# Patient Record
Sex: MALE | Race: WHITE | NOT HISPANIC OR LATINO | URBAN - METROPOLITAN AREA
[De-identification: names, ages, dates, MRNs, and addresses within clinical notes are randomized per-mention and may not be internally consistent; named-entity substitution may affect disease eponyms.]

---

## 2018-12-20 ENCOUNTER — EMERGENCY (EMERGENCY)
Facility: HOSPITAL | Age: 68
LOS: 1 days | Discharge: HOME | End: 2018-12-20
Attending: INTERNAL MEDICINE | Admitting: INTERNAL MEDICINE
Payer: MEDICARE

## 2018-12-20 VITALS
DIASTOLIC BLOOD PRESSURE: 80 MMHG | OXYGEN SATURATION: 98 % | TEMPERATURE: 98 F | SYSTOLIC BLOOD PRESSURE: 122 MMHG | HEART RATE: 80 BPM | RESPIRATION RATE: 17 BRPM

## 2018-12-20 VITALS
WEIGHT: 190.04 LBS | DIASTOLIC BLOOD PRESSURE: 81 MMHG | RESPIRATION RATE: 17 BRPM | OXYGEN SATURATION: 96 % | SYSTOLIC BLOOD PRESSURE: 139 MMHG | HEIGHT: 72 IN | TEMPERATURE: 97 F | HEART RATE: 86 BPM

## 2018-12-20 DIAGNOSIS — S01.111A LACERATION WITHOUT FOREIGN BODY OF RIGHT EYELID AND PERIOCULAR AREA, INITIAL ENCOUNTER: ICD-10-CM

## 2018-12-20 LAB
ALBUMIN SERPL ELPH-MCNC: 3.4 G/DL — SIGNIFICANT CHANGE UP (ref 3.3–5)
ALP SERPL-CCNC: 65 U/L — SIGNIFICANT CHANGE UP (ref 40–120)
ALT FLD-CCNC: 32 U/L DA — SIGNIFICANT CHANGE UP (ref 10–45)
ANION GAP SERPL CALC-SCNC: 7 MMOL/L — SIGNIFICANT CHANGE UP (ref 5–17)
AST SERPL-CCNC: 28 U/L — SIGNIFICANT CHANGE UP (ref 10–40)
BASOPHILS # BLD AUTO: 0.1 K/UL — SIGNIFICANT CHANGE UP (ref 0–0.2)
BASOPHILS NFR BLD AUTO: 0.6 % — SIGNIFICANT CHANGE UP (ref 0–2)
BILIRUB SERPL-MCNC: 0.5 MG/DL — SIGNIFICANT CHANGE UP (ref 0.2–1.2)
BUN SERPL-MCNC: 19 MG/DL — SIGNIFICANT CHANGE UP (ref 7–23)
CALCIUM SERPL-MCNC: 8.6 MG/DL — SIGNIFICANT CHANGE UP (ref 8.4–10.5)
CHLORIDE SERPL-SCNC: 104 MMOL/L — SIGNIFICANT CHANGE UP (ref 96–108)
CO2 SERPL-SCNC: 29 MMOL/L — SIGNIFICANT CHANGE UP (ref 22–31)
CREAT SERPL-MCNC: 1.16 MG/DL — SIGNIFICANT CHANGE UP (ref 0.5–1.3)
EOSINOPHIL # BLD AUTO: 0.1 K/UL — SIGNIFICANT CHANGE UP (ref 0–0.5)
EOSINOPHIL NFR BLD AUTO: 1.2 % — SIGNIFICANT CHANGE UP (ref 0–6)
GLUCOSE SERPL-MCNC: 144 MG/DL — HIGH (ref 70–99)
HCT VFR BLD CALC: 43.3 % — SIGNIFICANT CHANGE UP (ref 39–50)
HGB BLD-MCNC: 14.4 G/DL — SIGNIFICANT CHANGE UP (ref 13–17)
LYMPHOCYTES # BLD AUTO: 1.4 K/UL — SIGNIFICANT CHANGE UP (ref 1–3.3)
LYMPHOCYTES # BLD AUTO: 11.5 % — LOW (ref 13–44)
MCHC RBC-ENTMCNC: 29.8 PG — SIGNIFICANT CHANGE UP (ref 27–34)
MCHC RBC-ENTMCNC: 33.3 GM/DL — SIGNIFICANT CHANGE UP (ref 32–36)
MCV RBC AUTO: 89.5 FL — SIGNIFICANT CHANGE UP (ref 80–100)
MONOCYTES # BLD AUTO: 0.5 K/UL — SIGNIFICANT CHANGE UP (ref 0–0.9)
MONOCYTES NFR BLD AUTO: 4.5 % — SIGNIFICANT CHANGE UP (ref 1–9)
NEUTROPHILS # BLD AUTO: 9.9 K/UL — HIGH (ref 1.8–7.4)
NEUTROPHILS NFR BLD AUTO: 82.2 % — HIGH (ref 43–77)
PLATELET # BLD AUTO: 202 K/UL — SIGNIFICANT CHANGE UP (ref 150–400)
POTASSIUM SERPL-MCNC: 4.8 MMOL/L — SIGNIFICANT CHANGE UP (ref 3.5–5.3)
POTASSIUM SERPL-SCNC: 4.8 MMOL/L — SIGNIFICANT CHANGE UP (ref 3.5–5.3)
PROT SERPL-MCNC: 6.6 G/DL — SIGNIFICANT CHANGE UP (ref 6–8.3)
RBC # BLD: 4.84 M/UL — SIGNIFICANT CHANGE UP (ref 4.2–5.8)
RBC # FLD: 11.6 % — SIGNIFICANT CHANGE UP (ref 10.3–14.5)
SODIUM SERPL-SCNC: 140 MMOL/L — SIGNIFICANT CHANGE UP (ref 135–145)
WBC # BLD: 12 K/UL — HIGH (ref 3.8–10.5)
WBC # FLD AUTO: 12 K/UL — HIGH (ref 3.8–10.5)

## 2018-12-20 PROCEDURE — 80053 COMPREHEN METABOLIC PANEL: CPT

## 2018-12-20 PROCEDURE — 72125 CT NECK SPINE W/O DYE: CPT | Mod: 26

## 2018-12-20 PROCEDURE — 71260 CT THORAX DX C+: CPT | Mod: 26

## 2018-12-20 PROCEDURE — 72125 CT NECK SPINE W/O DYE: CPT

## 2018-12-20 PROCEDURE — 71260 CT THORAX DX C+: CPT

## 2018-12-20 PROCEDURE — 90471 IMMUNIZATION ADMIN: CPT

## 2018-12-20 PROCEDURE — 85027 COMPLETE CBC AUTOMATED: CPT

## 2018-12-20 PROCEDURE — 90715 TDAP VACCINE 7 YRS/> IM: CPT

## 2018-12-20 PROCEDURE — 99284 EMERGENCY DEPT VISIT MOD MDM: CPT

## 2018-12-20 PROCEDURE — 74177 CT ABD & PELVIS W/CONTRAST: CPT | Mod: 26

## 2018-12-20 PROCEDURE — 99284 EMERGENCY DEPT VISIT MOD MDM: CPT | Mod: 25

## 2018-12-20 PROCEDURE — 74177 CT ABD & PELVIS W/CONTRAST: CPT

## 2018-12-20 RX ORDER — OXYCODONE AND ACETAMINOPHEN 5; 325 MG/1; MG/1
1 TABLET ORAL
Qty: 15 | Refills: 0
Start: 2018-12-20 | End: 2018-12-22

## 2018-12-20 RX ORDER — MORPHINE SULFATE 50 MG/1
4 CAPSULE, EXTENDED RELEASE ORAL ONCE
Qty: 0 | Refills: 0 | Status: DISCONTINUED | OUTPATIENT
Start: 2018-12-20 | End: 2018-12-20

## 2018-12-20 RX ORDER — IBUPROFEN 200 MG
1 TABLET ORAL
Qty: 30 | Refills: 0
Start: 2018-12-20 | End: 2018-12-29

## 2018-12-20 RX ORDER — TETANUS TOXOID, REDUCED DIPHTHERIA TOXOID AND ACELLULAR PERTUSSIS VACCINE, ADSORBED 5; 2.5; 8; 8; 2.5 [IU]/.5ML; [IU]/.5ML; UG/.5ML; UG/.5ML; UG/.5ML
0.5 SUSPENSION INTRAMUSCULAR ONCE
Qty: 0 | Refills: 0 | Status: COMPLETED | OUTPATIENT
Start: 2018-12-20 | End: 2018-12-20

## 2018-12-20 RX ORDER — DIAZEPAM 5 MG
5 TABLET ORAL ONCE
Qty: 0 | Refills: 0 | Status: DISCONTINUED | OUTPATIENT
Start: 2018-12-20 | End: 2018-12-20

## 2018-12-20 RX ORDER — DIAZEPAM 5 MG
1 TABLET ORAL
Qty: 9 | Refills: 0
Start: 2018-12-20 | End: 2018-12-22

## 2018-12-20 RX ADMIN — MORPHINE SULFATE 4 MILLIGRAM(S): 50 CAPSULE, EXTENDED RELEASE ORAL at 19:30

## 2018-12-20 RX ADMIN — MORPHINE SULFATE 4 MILLIGRAM(S): 50 CAPSULE, EXTENDED RELEASE ORAL at 19:15

## 2018-12-20 RX ADMIN — MORPHINE SULFATE 4 MILLIGRAM(S): 50 CAPSULE, EXTENDED RELEASE ORAL at 14:46

## 2018-12-20 RX ADMIN — TETANUS TOXOID, REDUCED DIPHTHERIA TOXOID AND ACELLULAR PERTUSSIS VACCINE, ADSORBED 0.5 MILLILITER(S): 5; 2.5; 8; 8; 2.5 SUSPENSION INTRAMUSCULAR at 16:10

## 2018-12-20 RX ADMIN — MORPHINE SULFATE 4 MILLIGRAM(S): 50 CAPSULE, EXTENDED RELEASE ORAL at 14:31

## 2018-12-20 RX ADMIN — Medication 5 MILLIGRAM(S): at 14:31

## 2018-12-20 NOTE — ED PROVIDER NOTE - OBJECTIVE STATEMENT
68 year old male, PMHx of HTN; presents to the ED s/p fall. Patient states he was in the attic trying to get his cat, and he accidentally fell through the ceiling vent. He fell from ceiling height to ground approximately 8.5ft lading on his left side. He was able to slowly get up and ambulate to the car to come to the ED. He complains of severe left lower back pain. He sustained a right eyebrow superficial laceration (possibly on the metal vent); but had no other head trauma. He denies LOC, neck pain, numbness, tingling, changes in bowel/bladder continence, abdominal pain, or other complaints.

## 2018-12-20 NOTE — ED PROVIDER NOTE - NS CPE EDP MUSC LUMBAR LOC
Left lower paraspinal lumbar tenderness. No VPT, no step offs, no deformities, no discoloration/tenderness

## 2018-12-20 NOTE — ED ADULT NURSE NOTE - OBJECTIVE STATEMENT
as per patient he was getting wendy gifts out of the attic and he fell straight down about 8.5ft. pt complaining of 8/10 back pain. pt denies LOC. pt denies hitting head. pt denies sob, chest pain. pt denies numbness/tingling.

## 2018-12-20 NOTE — ED ADULT NURSE NOTE - NSIMPLEMENTINTERV_GEN_ALL_ED
Implemented All Fall with Harm Risk Interventions:  [place] to call system. Call bell, personal items and telephone within reach. Instruct patient to call for assistance. Room bathroom lighting operational. Non-slip footwear when patient is off stretcher. Physically safe environment: no spills, clutter or unnecessary equipment. Stretcher in lowest position, wheels locked, appropriate side rails in place. Provide visual cue, wrist band, yellow gown, etc. Monitor gait and stability. Monitor for mental status changes and reorient to person, place, and time. Review medications for side effects contributing to fall risk. Reinforce activity limits and safety measures with patient and family. Provide visual clues: red socks.

## 2018-12-20 NOTE — ED PROVIDER NOTE - CHPI ED SYMPTOMS NEG
no bleeding/no fever/no deformity/no confusion/no loss of consciousness/no numbness/no tingling/no weakness

## 2018-12-20 NOTE — ED PROVIDER NOTE - MEDICAL DECISION MAKING DETAILS
68 year old male pw back pain s/p fall x 8.5ft- will ct given CHERYL, labs, pain control, last tdap unknown, will update

## 2018-12-20 NOTE — ED PROVIDER NOTE - NSFOLLOWUPINSTRUCTIONS_ED_ALL_ED_FT
Rest, advance activity as tolerated  Apply heat to affected area 15 min on/15 min off  Take Motrin 800mg every 8 hours with food as needed for pain  Take Valium 5mg every 8 hours as needed - DO NOT DRIVE ON THIS MEDICATION  Take Percocet 1 tab every 6 hours as needed for breakthrough pain- DO NOT DRIVE ON THIS MEDICATION  Follow up with your PMD within 2-3 days  Return to the ER for worsening    Follow up with orthopedics  If you have issues obtaining follow up, please call: [phone] to obtain a doctor or specialist who takes your insurance in your area.

## 2019-01-04 PROBLEM — I10 ESSENTIAL (PRIMARY) HYPERTENSION: Chronic | Status: ACTIVE | Noted: 2018-12-20

## 2019-01-11 ENCOUNTER — APPOINTMENT (OUTPATIENT)
Dept: INTERNAL MEDICINE | Facility: CLINIC | Age: 69
End: 2019-01-11
Payer: MEDICARE

## 2019-01-11 VITALS
WEIGHT: 189 LBS | OXYGEN SATURATION: 100 % | BODY MASS INDEX: 25.6 KG/M2 | SYSTOLIC BLOOD PRESSURE: 140 MMHG | TEMPERATURE: 97.9 F | RESPIRATION RATE: 16 BRPM | HEART RATE: 90 BPM | HEIGHT: 72 IN | DIASTOLIC BLOOD PRESSURE: 80 MMHG

## 2019-01-11 DIAGNOSIS — Z72.3 LACK OF PHYSICAL EXERCISE: ICD-10-CM

## 2019-01-11 PROCEDURE — G0439: CPT

## 2019-01-11 RX ORDER — OXYCODONE AND ACETAMINOPHEN 5; 325 MG/1; MG/1
5-325 TABLET ORAL
Qty: 15 | Refills: 0 | Status: DISCONTINUED | COMMUNITY
Start: 2018-12-20

## 2019-01-11 RX ORDER — DIAZEPAM 5 MG/1
5 TABLET ORAL
Qty: 9 | Refills: 0 | Status: DISCONTINUED | COMMUNITY
Start: 2018-12-20

## 2019-01-11 RX ORDER — IBUPROFEN 800 MG/1
800 TABLET, FILM COATED ORAL
Qty: 30 | Refills: 0 | Status: DISCONTINUED | COMMUNITY
Start: 2018-12-20

## 2019-01-11 RX ORDER — TRAMADOL HYDROCHLORIDE 50 MG/1
50 TABLET, COATED ORAL
Qty: 60 | Refills: 0 | Status: DISCONTINUED | COMMUNITY
Start: 2019-01-03

## 2019-01-11 RX ORDER — CYCLOBENZAPRINE HYDROCHLORIDE 10 MG/1
10 TABLET, FILM COATED ORAL
Qty: 40 | Refills: 0 | Status: DISCONTINUED | COMMUNITY
Start: 2019-01-03

## 2019-01-11 NOTE — HEALTH RISK ASSESSMENT
[No falls in past year] : Patient reported no falls in the past year [0] : 2) Feeling down, depressed, or hopeless: Not at all (0) [HIV test declined] : HIV test declined [Hepatitis C test declined] : Hepatitis C test declined [[id number]] : 0

## 2019-01-11 NOTE — HISTORY OF PRESENT ILLNESS
[FreeTextEntry1] : Comes to the office as a new patient for a comprehensive physical examination to review his medications discuss his overall health and received a slip for complete blood tests [de-identified] : 68-year-old man comes to the office as a new patient for a comprehensive physical examination past history significant for hypertension recently undergoing a fall on 20 December falling through his attic roof was evaluated in the emergency room had CAT scans of his neck chest abdomen and pelvis without serious injury he is still having pain in his lower back she describes as 2-3/10 it has improved he denies temperature chills sweats or myalgias he's had no headache sinus congestion sore throat or wheezing shortness of breath chest pain pleurisy personal dyspnea lightheadedness palpitations dizziness vertigo or syncope he denies abdominal pain dysuria nausea vomiting diarrhea constipation bright red blood per rectum or black stools appetite has been good his weight has been stable doesn't exercise because of an issue with the bottom of her foot which she has been trying to deal with with his podiatrist

## 2019-01-11 NOTE — REVIEW OF SYSTEMS
[Negative] : Heme/Lymph [Nocturia] : nocturia [Impotence] : impotence [Fever] : no fever [Chills] : no chills [Fatigue] : no fatigue [Hot Flashes] : no hot flashes [Night Sweats] : no night sweats [Recent Change In Weight] : ~T no recent weight change [Discharge] : no discharge [Pain] : no pain [Redness] : no redness [Dryness] : no dryness [Vision Problems] : no vision problems [Itching] : no itching [Earache] : no earache [Hearing Loss] : no hearing loss [Nosebleeds] : no nosebleeds [Postnasal Drip] : no postnasal drip [Nasal Discharge] : no nasal discharge [Sore Throat] : no sore throat [Hoarseness] : no hoarseness [Chest Pain] : no chest pain [Palpitations] : no palpitations [Claudication] : no  leg claudication [Lower Ext Edema] : no lower extremity edema [Orthopena] : no orthopnea [Paroysmal Nocturnal Dyspnea] : no paroysmal nocturnal dyspnea [Shortness Of Breath] : no shortness of breath [Wheezing] : no wheezing [Cough] : no cough [Dyspnea on Exertion] : not dyspnea on exertion [Abdominal Pain] : no abdominal pain [Nausea] : no nausea [Constipation] : no constipation [Diarrhea] : no diarrhea [Vomiting] : no vomiting [Heartburn] : no heartburn [Melena] : no melena [Dysuria] : no dysuria [Incontinence] : no incontinence [Hesitancy] : no hesitancy [Hematuria] : no hematuria [Frequency] : no frequency [Poor Libido] : libido not poor [Joint Pain] : no joint pain [Joint Stiffness] : no joint stiffness [Muscle Pain] : no muscle pain [Muscle Weakness] : no muscle weakness [Back Pain] : no back pain [Joint Swelling] : no joint swelling [Itching] : no itching [Mole Changes] : no mole changes [Nail Changes] : no nail changes [Hair Changes] : no hair changes [Skin Rash] : no skin rash [Headache] : no headache [Dizziness] : no dizziness [Fainting] : no fainting [Confusion] : no confusion [Unsteady Walk] : no ataxia [Memory Loss] : no memory loss [Suicidal] : not suicidal [Insomnia] : no insomnia [Anxiety] : no anxiety [Depression] : no depression [Easy Bleeding] : no easy bleeding [Easy Bruising] : no easy bruising [Swollen Glands] : no swollen glands

## 2019-01-11 NOTE — ASSESSMENT
[FreeTextEntry1] : Zickel exam shows a well-developed man in no acute distress his blood pressure was 140/86 feet 189 pounds heart rate of 90 respirations of 16 HEENT was unremarkable chest was clear to auscultation and percussion cardiovascular exam showed a normal S1 and S2 with no extra heart sounds or murmurs abdomen was soft extremities showed no clubbing cyanosis or edema the neurologic exam was nonfocal patient defers an EKG today will think about it during his next visit a slip was given for complete blood tests including CBC full chemistries hemoglobin A1c fasting blood sugar thyroid profile lipid profile urinalysis and vitamin D 3 and B12 patient is up to date with his ophthalmologist and dermatologist and is scheduled to make an appointment with Dr. Cook for a screening colonoscopy.Patient is asking about discontinuing his blood pressure pill present blood pressures pills cannot be reduced he was placed on a DASH diet and is encouraged to correct his problem was his foot so he could incorporate exercise into his schedule

## 2019-01-30 LAB
25(OH)D3 SERPL-MCNC: 34.5 NG/ML
ALBUMIN SERPL ELPH-MCNC: 4.4 G/DL
ALP BLD-CCNC: 145 U/L
ALT SERPL-CCNC: 32 U/L
ANION GAP SERPL CALC-SCNC: 11 MMOL/L
APPEARANCE: CLEAR
AST SERPL-CCNC: 23 U/L
BASOPHILS # BLD AUTO: 0.03 K/UL
BASOPHILS NFR BLD AUTO: 0.4 %
BILIRUB SERPL-MCNC: 0.6 MG/DL
BILIRUBIN URINE: NEGATIVE
BLOOD URINE: NEGATIVE
BUN SERPL-MCNC: 17 MG/DL
CALCIUM SERPL-MCNC: 9.6 MG/DL
CHLORIDE SERPL-SCNC: 101 MMOL/L
CHOLEST SERPL-MCNC: 184 MG/DL
CHOLEST/HDLC SERPL: 3.4 RATIO
CO2 SERPL-SCNC: 28 MMOL/L
COLOR: YELLOW
CREAT SERPL-MCNC: 1.06 MG/DL
CRP SERPL HS-MCNC: 0.4 MG/L
EOSINOPHIL # BLD AUTO: 0.54 K/UL
EOSINOPHIL NFR BLD AUTO: 8 %
GLUCOSE BS SERPL-MCNC: 96 MG/DL
GLUCOSE QUALITATIVE U: NEGATIVE MG/DL
GLUCOSE SERPL-MCNC: 105 MG/DL
HBA1C MFR BLD HPLC: 5.8 %
HCT VFR BLD CALC: 45.2 %
HDLC SERPL-MCNC: 54 MG/DL
HGB BLD-MCNC: 14.6 G/DL
IMM GRANULOCYTES NFR BLD AUTO: 0.3 %
KETONES URINE: NEGATIVE
LDLC SERPL CALC-MCNC: 113 MG/DL
LEUKOCYTE ESTERASE URINE: NEGATIVE
LYMPHOCYTES # BLD AUTO: 1.99 K/UL
LYMPHOCYTES NFR BLD AUTO: 29.4 %
MAN DIFF?: NORMAL
MCHC RBC-ENTMCNC: 29.9 PG
MCHC RBC-ENTMCNC: 32.3 GM/DL
MCV RBC AUTO: 92.4 FL
MONOCYTES # BLD AUTO: 0.68 K/UL
MONOCYTES NFR BLD AUTO: 10 %
NEUTROPHILS # BLD AUTO: 3.52 K/UL
NEUTROPHILS NFR BLD AUTO: 51.9 %
NITRITE URINE: NEGATIVE
PH URINE: 6
PLATELET # BLD AUTO: 240 K/UL
POTASSIUM SERPL-SCNC: 5 MMOL/L
PROT SERPL-MCNC: 7 G/DL
PROTEIN URINE: NEGATIVE MG/DL
PSA FREE FLD-MCNC: 23 %
PSA FREE SERPL-MCNC: 0.19 NG/ML
PSA SERPL-MCNC: 0.84 NG/ML
RBC # BLD: 4.89 M/UL
RBC # FLD: 13.2 %
SODIUM SERPL-SCNC: 140 MMOL/L
SPECIFIC GRAVITY URINE: 1.01
T4 FREE SERPL-MCNC: 1.6 NG/DL
TESTOST BND SERPL-MCNC: 8.6 PG/ML
TESTOST SERPL-MCNC: 371.1 NG/DL
TRIGL SERPL-MCNC: 84 MG/DL
TSH SERPL-ACNC: 3.38 UIU/ML
UROBILINOGEN URINE: NEGATIVE MG/DL
VIT B12 SERPL-MCNC: 676 PG/ML
WBC # FLD AUTO: 6.78 K/UL

## 2019-10-22 ENCOUNTER — APPOINTMENT (OUTPATIENT)
Dept: INTERNAL MEDICINE | Facility: CLINIC | Age: 69
End: 2019-10-22
Payer: MEDICARE

## 2019-10-22 VITALS
HEIGHT: 72 IN | RESPIRATION RATE: 15 BRPM | TEMPERATURE: 97.7 F | HEART RATE: 64 BPM | OXYGEN SATURATION: 97 % | DIASTOLIC BLOOD PRESSURE: 80 MMHG | WEIGHT: 187 LBS | SYSTOLIC BLOOD PRESSURE: 146 MMHG | BODY MASS INDEX: 25.33 KG/M2

## 2019-10-22 PROCEDURE — 99215 OFFICE O/P EST HI 40 MIN: CPT

## 2019-10-22 NOTE — HISTORY OF PRESENT ILLNESS
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 for followup patient is complaining of episodic fatigue snoring stiffness of his neck and occasional pain in his lower back he denies temperature chills sweats or myalgias he's had no headaches sinus congestion sore throat cough wheezing pleurisy chest pain shortness of breath exertional dyspnea lightheadedness palpitations dizziness vertigo or syncope he denies abdominal pain nausea vomiting diarrhea or constipation bright red blood per rectum or black stools his appetite has been good his weight has been stable he denies significant musculoskeletal complaints he does have bad snoring he has had no leg edema or recent skin rashes

## 2019-10-22 NOTE — REVIEW OF SYSTEMS
[Nocturia] : nocturia [Impotence] : impotence [Negative] : Psychiatric [Fever] : no fever [Fatigue] : no fatigue [Chills] : no chills [Hot Flashes] : no hot flashes [Night Sweats] : no night sweats [Discharge] : no discharge [Recent Change In Weight] : ~T no recent weight change [Redness] : no redness [Pain] : no pain [Dryness] : no dryness [Vision Problems] : no vision problems [Itching] : no itching [Earache] : no earache [Hearing Loss] : no hearing loss [Nosebleeds] : no nosebleeds [Nasal Discharge] : no nasal discharge [Postnasal Drip] : no postnasal drip [Sore Throat] : no sore throat [Palpitations] : no palpitations [Hoarseness] : no hoarseness [Chest Pain] : no chest pain [Claudication] : no  leg claudication [Lower Ext Edema] : no lower extremity edema [Orthopena] : no orthopnea [Shortness Of Breath] : no shortness of breath [Cough] : no cough [Dyspnea on Exertion] : not dyspnea on exertion [Wheezing] : no wheezing [Abdominal Pain] : no abdominal pain [Nausea] : no nausea [Diarrhea] : no diarrhea [Vomiting] : no vomiting [Constipation] : no constipation [Heartburn] : no heartburn [Dysuria] : no dysuria [Melena] : no melena [Incontinence] : no incontinence [Hematuria] : no hematuria [Hesitancy] : no hesitancy [Frequency] : no frequency [Poor Libido] : libido not poor [Joint Pain] : no joint pain [Muscle Pain] : no muscle pain [Joint Stiffness] : no joint stiffness [Muscle Weakness] : no muscle weakness [Back Pain] : no back pain [Joint Swelling] : no joint swelling [Mole Changes] : no mole changes [Hair Changes] : no hair changes [Nail Changes] : no nail changes [Skin Rash] : no skin rash [Headache] : no headache [Dizziness] : no dizziness [Confusion] : no confusion [Fainting] : no fainting [Memory Loss] : no memory loss [Unsteady Walk] : no ataxia [Suicidal] : not suicidal [Insomnia] : no insomnia [Depression] : no depression [Anxiety] : no anxiety [Easy Bleeding] : no easy bleeding [Easy Bruising] : no easy bruising [Swollen Glands] : no swollen glands

## 2019-10-22 NOTE — HEALTH RISK ASSESSMENT
[Yes] : Yes [No falls in past year] : Patient reported no falls in the past year [0] : 2) Feeling down, depressed, or hopeless: Not at all (0) [[id number]] : 0 [] : No

## 2019-10-22 NOTE — PHYSICAL EXAM
[No Acute Distress] : no acute distress [Well Nourished] : well nourished [Well Developed] : well developed [Well-Appearing] : well-appearing [Normal Sclera/Conjunctiva] : normal sclera/conjunctiva [Normal Voice/Communication] : normal voice/communication [EOMI] : extraocular movements intact [PERRL] : pupils equal round and reactive to light [Normal Outer Ear/Nose] : the outer ears and nose were normal in appearance [No JVD] : no jugular venous distention [Normal Oropharynx] : the oropharynx was normal [No Lymphadenopathy] : no lymphadenopathy [Supple] : supple [Thyroid Normal, No Nodules] : the thyroid was normal and there were no nodules present [No Accessory Muscle Use] : no accessory muscle use [No Respiratory Distress] : no respiratory distress  [Clear to Auscultation] : lungs were clear to auscultation bilaterally [Normal Rate] : normal rate  [Regular Rhythm] : with a regular rhythm [Normal S1, S2] : normal S1 and S2 [No Murmur] : no murmur heard [No Carotid Bruits] : no carotid bruits [No Abdominal Bruit] : a ~M bruit was not heard ~T in the abdomen [No Varicosities] : no varicosities [No Edema] : there was no peripheral edema [Pedal Pulses Present] : the pedal pulses are present [No Palpable Aorta] : no palpable aorta [Soft] : abdomen soft [No Extremity Clubbing/Cyanosis] : no extremity clubbing/cyanosis [Non-distended] : non-distended [Non Tender] : non-tender [No HSM] : no HSM [Normal Bowel Sounds] : normal bowel sounds [No Masses] : no abdominal mass palpated [Normal Posterior Cervical Nodes] : no posterior cervical lymphadenopathy [Normal Anterior Cervical Nodes] : no anterior cervical lymphadenopathy [No CVA Tenderness] : no CVA  tenderness [No Spinal Tenderness] : no spinal tenderness [No Joint Swelling] : no joint swelling [Grossly Normal Strength/Tone] : grossly normal strength/tone [Coordination Grossly Intact] : coordination grossly intact [No Rash] : no rash [No Focal Deficits] : no focal deficits [Normal Gait] : normal gait [Deep Tendon Reflexes (DTR)] : deep tendon reflexes were 2+ and symmetric [Normal Affect] : the affect was normal [Normal Insight/Judgement] : insight and judgment were intact

## 2019-10-22 NOTE — ASSESSMENT
[FreeTextEntry1] : Physical exam shows a well-developed man in no acute distress blood pressure 146/80 pulse is 64 respirations 15 HEENT was unremarkable chest was clear vascular was regular with no extra heart sounds or murmurs abdomen was soft and nontender extremities showed no clubbing cyanosis or edema neurologic exam was nonfocal patient requesting physical therapy on a stiff neck and lower back issue as well as to help him with his posture as per his episodes of fatigue I am uncertain as to its etiology I suggested sleep study neuro evaluation for possible narcolepsy and cardiac evaluation or rule out heart rhythm problems he is up to date with his ophthalmologist and dermatologist however he is behind on his colonoscopy followup he has received notifications from Dr. Cook .Blood pressure well controlled

## 2019-12-11 ENCOUNTER — RX RENEWAL (OUTPATIENT)
Age: 69
End: 2019-12-11

## 2019-12-31 ENCOUNTER — TRANSCRIPTION ENCOUNTER (OUTPATIENT)
Age: 69
End: 2019-12-31

## 2020-06-15 ENCOUNTER — RX RENEWAL (OUTPATIENT)
Age: 70
End: 2020-06-15

## 2020-09-23 ENCOUNTER — RX RENEWAL (OUTPATIENT)
Age: 70
End: 2020-09-23

## 2021-01-10 ENCOUNTER — RX RENEWAL (OUTPATIENT)
Age: 71
End: 2021-01-10

## 2021-01-15 ENCOUNTER — RX RENEWAL (OUTPATIENT)
Age: 71
End: 2021-01-15

## 2021-01-22 ENCOUNTER — NON-APPOINTMENT (OUTPATIENT)
Age: 71
End: 2021-01-22

## 2021-01-22 ENCOUNTER — APPOINTMENT (OUTPATIENT)
Dept: INTERNAL MEDICINE | Facility: CLINIC | Age: 71
End: 2021-01-22
Payer: MEDICARE

## 2021-01-22 VITALS
RESPIRATION RATE: 16 BRPM | HEART RATE: 64 BPM | OXYGEN SATURATION: 96 % | SYSTOLIC BLOOD PRESSURE: 130 MMHG | HEIGHT: 72 IN | BODY MASS INDEX: 24.92 KG/M2 | TEMPERATURE: 98.1 F | DIASTOLIC BLOOD PRESSURE: 78 MMHG | WEIGHT: 184 LBS

## 2021-01-22 PROCEDURE — G0439: CPT

## 2021-01-22 PROCEDURE — 93000 ELECTROCARDIOGRAM COMPLETE: CPT | Mod: 59

## 2021-01-22 PROCEDURE — G0444 DEPRESSION SCREEN ANNUAL: CPT | Mod: 59

## 2021-01-24 NOTE — REVIEW OF SYSTEMS
[Nocturia] : nocturia [Frequency] : frequency [Impotence] : impotence [Negative] : Heme/Lymph [Fever] : no fever [Chills] : no chills [Fatigue] : no fatigue [Hot Flashes] : no hot flashes [Night Sweats] : no night sweats [Recent Change In Weight] : ~T no recent weight change [Discharge] : no discharge [Pain] : no pain [Redness] : no redness [Dryness] : no dryness [Vision Problems] : no vision problems [Itching] : no itching [Earache] : no earache [Hearing Loss] : no hearing loss [Nosebleeds] : no nosebleeds [Postnasal Drip] : no postnasal drip [Nasal Discharge] : no nasal discharge [Sore Throat] : no sore throat [Hoarseness] : no hoarseness [Chest Pain] : no chest pain [Palpitations] : no palpitations [Claudication] : no  leg claudication [Lower Ext Edema] : no lower extremity edema [Orthopena] : no orthopnea [Paroxysmal Nocturnal Dyspnea] : no paroxysmal nocturnal dyspnea [Wheezing] : no wheezing [Shortness Of Breath] : no shortness of breath [Cough] : no cough [Dyspnea on Exertion] : not dyspnea on exertion [Abdominal Pain] : no abdominal pain [Nausea] : no nausea [Constipation] : no constipation [Diarrhea] : no diarrhea [Vomiting] : no vomiting [Heartburn] : no heartburn [Melena] : no melena [Dysuria] : no dysuria [Hesitancy] : no hesitancy [Incontinence] : no incontinence [Hematuria] : no hematuria [Poor Libido] : libido not poor [Joint Pain] : no joint pain [Joint Stiffness] : no joint stiffness [Muscle Pain] : no muscle pain [Muscle Weakness] : no muscle weakness [Back Pain] : back pain [Joint Swelling] : no joint swelling [Mole Changes] : no mole changes [Nail Changes] : no nail changes [Hair Changes] : no hair changes [Skin Rash] : no skin rash [Headache] : no headache [Dizziness] : no dizziness [Fainting] : no fainting [Confusion] : no confusion [Unsteady Walk] : no ataxia [Memory Loss] : no memory loss [Suicidal] : not suicidal [Insomnia] : no insomnia [Anxiety] : no anxiety [Easy Bleeding] : no easy bleeding [Depression] : depression [Easy Bruising] : no easy bruising [Swollen Glands] : no swollen glands

## 2021-01-24 NOTE — HISTORY OF PRESENT ILLNESS
[FreeTextEntry1] : 70-year-old man comes to the office for a comprehensive physical examination to review his medications and discuss his overall health recently urinating frequently and feeling tired and down [de-identified] : Comes to the office for a comprehensive physical examination with a history of lower back pain ocular nevus of the right eye and fatigue and depression he denies temperature chills sweats or myalgias he has had no headache sinus congestion sore throat cough wheezing pleurisy chest pain shortness of breath exertional dyspnea lightheadedness palpitations dizziness vertigo or syncope he has had no abdominal pain nausea vomiting.patient bright red blood per rectum or black stools his appetite has been good his weight has been stable he has occasional lower back pain urinates frequently and does gets up several times at night to urinate but denies dysuria or gross hematuria he has had no leg edema skin rashes and usually well

## 2021-01-24 NOTE — HEALTH RISK ASSESSMENT
[HIV test declined] : HIV test declined [Hepatitis C test declined] : Hepatitis C test declined [] : No [Yes] : Yes [No falls in past year] : Patient reported no falls in the past year [0] : 2) Feeling down, depressed, or hopeless: Not at all (0) [[id number]] : o

## 2021-01-24 NOTE — ASSESSMENT
[FreeTextEntry1] : Sickle exam shows a well-developed man in no acute distress blood pressure 130/78 height 6 feet weight 184 pounds BMI 24.95 temperature 98.1 °F heart rate of 64 respiration 16 oxygen saturation on room air 98% HEENT was unremarkable chest was clear cardiovascular exam was regular abdomen was soft and nontender extremities showed no clubbing cyanosis or edema neurologic exam was nonfocal EKG showed a normal sinus rhythm at 60 left atrial enlargement and possibly an incomplete right bundle branch block we will obtain old EKGs for comparison patient's recent blood tests were reviewed and significant for PSA of 0.6 total cholesterol of 194 HDL 59  globin A1c 5.7% the entity of prediabetes was discussed with him  vitamin D level was 42.6 patient was recommended to get the influenza vaccine the Prevnar 13 and the Pneumovax 23 vaccine as well as the new shingles vaccine patient also was reminded to keep current with his colonoscopy he does see a ophthalmologist and will make an appointment for cancer screen to a dermatologist patient follows actively because of his nevus of his right eye

## 2021-01-24 NOTE — PHYSICAL EXAM
[Well Nourished] : well nourished [Well Developed] : well developed [Well-Appearing] : well-appearing [Normal Voice/Communication] : normal voice/communication [Normal Sclera/Conjunctiva] : normal sclera/conjunctiva [PERRL] : pupils equal round and reactive to light [EOMI] : extraocular movements intact [Normal Oropharynx] : the oropharynx was normal [Normal Outer Ear/Nose] : the outer ears and nose were normal in appearance [No JVD] : no jugular venous distention [No Lymphadenopathy] : no lymphadenopathy [Supple] : supple [Thyroid Normal, No Nodules] : the thyroid was normal and there were no nodules present [No Respiratory Distress] : no respiratory distress  [No Accessory Muscle Use] : no accessory muscle use [Clear to Auscultation] : lungs were clear to auscultation bilaterally [Normal Rate] : normal rate  [Regular Rhythm] : with a regular rhythm [Normal S1, S2] : normal S1 and S2 [No Murmur] : no murmur heard [No Carotid Bruits] : no carotid bruits [No Abdominal Bruit] : a ~M bruit was not heard ~T in the abdomen [No Varicosities] : no varicosities [Pedal Pulses Present] : the pedal pulses are present [No Edema] : there was no peripheral edema [No Palpable Aorta] : no palpable aorta [No Extremity Clubbing/Cyanosis] : no extremity clubbing/cyanosis [Soft] : abdomen soft [Non-distended] : non-distended [Non Tender] : non-tender [No Masses] : no abdominal mass palpated [No HSM] : no HSM [Normal Bowel Sounds] : normal bowel sounds [Normal Posterior Cervical Nodes] : no posterior cervical lymphadenopathy [No CVA Tenderness] : no CVA  tenderness [Normal Anterior Cervical Nodes] : no anterior cervical lymphadenopathy [No Spinal Tenderness] : no spinal tenderness [No Joint Swelling] : no joint swelling [Grossly Normal Strength/Tone] : grossly normal strength/tone [No Rash] : no rash [Coordination Grossly Intact] : coordination grossly intact [Normal Gait] : normal gait [No Focal Deficits] : no focal deficits [Deep Tendon Reflexes (DTR)] : deep tendon reflexes were 2+ and symmetric [Normal Affect] : the affect was normal [Normal Insight/Judgement] : insight and judgment were intact

## 2021-01-25 ENCOUNTER — TRANSCRIPTION ENCOUNTER (OUTPATIENT)
Age: 71
End: 2021-01-25

## 2021-02-12 LAB
25(OH)D3 SERPL-MCNC: 42.6 NG/ML
ALBUMIN SERPL ELPH-MCNC: 4.2 G/DL
ALP BLD-CCNC: 71 U/L
ALT SERPL-CCNC: 20 U/L
ANION GAP SERPL CALC-SCNC: 12 MMOL/L
APPEARANCE: CLEAR
AST SERPL-CCNC: 20 U/L
BASOPHILS # BLD AUTO: 0.05 K/UL
BASOPHILS NFR BLD AUTO: 0.7 %
BILIRUB SERPL-MCNC: 0.7 MG/DL
BILIRUBIN URINE: NEGATIVE
BLOOD URINE: NEGATIVE
BUN SERPL-MCNC: 20 MG/DL
CALCIUM SERPL-MCNC: 9.3 MG/DL
CHLORIDE SERPL-SCNC: 104 MMOL/L
CHOLEST SERPL-MCNC: 194 MG/DL
CO2 SERPL-SCNC: 27 MMOL/L
COLOR: YELLOW
CREAT SERPL-MCNC: 1.16 MG/DL
CRP SERPL HS-MCNC: 0.21 MG/L
EOSINOPHIL # BLD AUTO: 0.6 K/UL
EOSINOPHIL NFR BLD AUTO: 8.5 %
ESTIMATED AVERAGE GLUCOSE: 117 MG/DL
GLUCOSE BS SERPL-MCNC: 92 MG/DL
GLUCOSE QUALITATIVE U: NEGATIVE
GLUCOSE SERPL-MCNC: 100 MG/DL
HBA1C MFR BLD HPLC: 5.7 %
HCT VFR BLD CALC: 44.7 %
HDLC SERPL-MCNC: 59 MG/DL
HGB BLD-MCNC: 14.7 G/DL
IMM GRANULOCYTES NFR BLD AUTO: 0.3 %
KETONES URINE: NEGATIVE
LDLC SERPL CALC-MCNC: 121 MG/DL
LEUKOCYTE ESTERASE URINE: NEGATIVE
LYMPHOCYTES # BLD AUTO: 2.36 K/UL
LYMPHOCYTES NFR BLD AUTO: 33.6 %
MAN DIFF?: NORMAL
MCHC RBC-ENTMCNC: 30.2 PG
MCHC RBC-ENTMCNC: 32.9 GM/DL
MCV RBC AUTO: 92 FL
MEV IGG FLD QL IA: >300 AU/ML
MEV IGG+IGM SER-IMP: POSITIVE
MONOCYTES # BLD AUTO: 0.57 K/UL
MONOCYTES NFR BLD AUTO: 8.1 %
MUV AB SER-ACNC: POSITIVE
MUV IGG SER QL IA: 277 AU/ML
NEUTROPHILS # BLD AUTO: 3.42 K/UL
NEUTROPHILS NFR BLD AUTO: 48.8 %
NITRITE URINE: NEGATIVE
NONHDLC SERPL-MCNC: 136 MG/DL
PH URINE: 6
PLATELET # BLD AUTO: 211 K/UL
POTASSIUM SERPL-SCNC: 4.8 MMOL/L
PROT SERPL-MCNC: 6.4 G/DL
PROTEIN URINE: NEGATIVE
PSA FREE FLD-MCNC: 27 %
PSA FREE SERPL-MCNC: 0.16 NG/ML
PSA SERPL-MCNC: 0.6 NG/ML
RBC # BLD: 4.86 M/UL
RBC # FLD: 12.4 %
RUBV IGG FLD-ACNC: 16.6 INDEX
RUBV IGG SER-IMP: POSITIVE
SARS-COV-2 IGG SERPL IA-ACNC: 0.08 INDEX
SARS-COV-2 IGG SERPL QL IA: NEGATIVE
SODIUM SERPL-SCNC: 143 MMOL/L
SPECIFIC GRAVITY URINE: 1.02
T4 FREE SERPL-MCNC: 1.3 NG/DL
TRIGL SERPL-MCNC: 71 MG/DL
TSH SERPL-ACNC: 5.66 UIU/ML
UROBILINOGEN URINE: NORMAL
VIT B12 SERPL-MCNC: 602 PG/ML
WBC # FLD AUTO: 7.02 K/UL

## 2021-03-09 ENCOUNTER — OUTPATIENT (OUTPATIENT)
Dept: OUTPATIENT SERVICES | Facility: HOSPITAL | Age: 71
LOS: 1 days | End: 2021-03-09
Payer: MEDICARE

## 2021-03-09 ENCOUNTER — APPOINTMENT (OUTPATIENT)
Dept: MRI IMAGING | Facility: HOSPITAL | Age: 71
End: 2021-03-09
Payer: MEDICARE

## 2021-03-09 DIAGNOSIS — Z00.8 ENCOUNTER FOR OTHER GENERAL EXAMINATION: ICD-10-CM

## 2021-03-09 PROCEDURE — 72148 MRI LUMBAR SPINE W/O DYE: CPT

## 2021-03-09 PROCEDURE — 72148 MRI LUMBAR SPINE W/O DYE: CPT | Mod: 26,MH

## 2022-03-11 ENCOUNTER — APPOINTMENT (OUTPATIENT)
Dept: INTERNAL MEDICINE | Facility: CLINIC | Age: 72
End: 2022-03-11
Payer: MEDICARE

## 2022-03-11 ENCOUNTER — NON-APPOINTMENT (OUTPATIENT)
Age: 72
End: 2022-03-11

## 2022-03-11 VITALS
DIASTOLIC BLOOD PRESSURE: 92 MMHG | TEMPERATURE: 98.1 F | BODY MASS INDEX: 25.19 KG/M2 | WEIGHT: 186 LBS | RESPIRATION RATE: 16 BRPM | HEART RATE: 63 BPM | SYSTOLIC BLOOD PRESSURE: 142 MMHG | HEIGHT: 72 IN | OXYGEN SATURATION: 97 %

## 2022-03-11 DIAGNOSIS — Z00.00 ENCOUNTER FOR GENERAL ADULT MEDICAL EXAMINATION W/OUT ABNORMAL FINDINGS: ICD-10-CM

## 2022-03-11 PROCEDURE — 93000 ELECTROCARDIOGRAM COMPLETE: CPT | Mod: 59

## 2022-03-11 PROCEDURE — G0439: CPT

## 2022-03-11 RX ORDER — SERTRALINE HYDROCHLORIDE 50 MG/1
50 TABLET, FILM COATED ORAL DAILY
Qty: 30 | Refills: 0 | Status: DISCONTINUED | COMMUNITY
Start: 2021-01-22 | End: 2022-03-11

## 2022-03-16 NOTE — ASSESSMENT
[FreeTextEntry1] : Physical examination shows a well-developed man in no acute distress blood pressure 142/92 height 6 feet weight 186 pounds BMI 25.23 temperature 98.1 °F rate of 63 respirations at 16 oxygen saturation on room air was 97% HEENT was unremarkable chest was clear cardiovascular exam was regular with no extra heart sounds or murmurs abdomen was soft and nontender extremities showed no clubbing cyanosis or edema and neurologic exam was nonfocal EKG showed a normal sinus rhythm at 60 there were no acute ST-T wave changes slip was given for complete blood tests including CBC full chemistries lipid profile thyroid profile urine analysis CRP vitamins D3 and B12 hemoglobin A1c COVID-19 nuclear capsid and spike domain antibodies patient is up-to-date with his ophthalmologist and dermatologist he will contact his gastroenterologist for the timing of his next colonoscopy patient is trying to eat better and walking exercises much as possible his blood pressure was well controlled at the present visit his ocular nevi are followed closely by a retinologist

## 2022-03-16 NOTE — PHYSICAL EXAM
[No Acute Distress] : no acute distress [Well Nourished] : well nourished [Well Developed] : well developed [Well-Appearing] : well-appearing [Normal Voice/Communication] : normal voice/communication [Normal Sclera/Conjunctiva] : normal sclera/conjunctiva [PERRL] : pupils equal round and reactive to light [EOMI] : extraocular movements intact [Normal Outer Ear/Nose] : the outer ears and nose were normal in appearance [Normal Oropharynx] : the oropharynx was normal [Normal TMs] : both tympanic membranes were normal [Normal Nasal Mucosa] : the nasal mucosa was normal [No JVD] : no jugular venous distention [No Lymphadenopathy] : no lymphadenopathy [Supple] : supple [Thyroid Normal, No Nodules] : the thyroid was normal and there were no nodules present [No Respiratory Distress] : no respiratory distress  [No Accessory Muscle Use] : no accessory muscle use [Clear to Auscultation] : lungs were clear to auscultation bilaterally [Normal Rate] : normal rate  [Regular Rhythm] : with a regular rhythm [Normal S1, S2] : normal S1 and S2 [No Murmur] : no murmur heard [No Carotid Bruits] : no carotid bruits [No Abdominal Bruit] : a ~M bruit was not heard ~T in the abdomen [No Varicosities] : no varicosities [Pedal Pulses Present] : the pedal pulses are present [No Edema] : there was no peripheral edema [No Palpable Aorta] : no palpable aorta [No Extremity Clubbing/Cyanosis] : no extremity clubbing/cyanosis [Declined Breast Exam] : declined breast exam  [Soft] : abdomen soft [Non Tender] : non-tender [Non-distended] : non-distended [No Masses] : no abdominal mass palpated [No HSM] : no HSM [Normal Bowel Sounds] : normal bowel sounds [No Hernias] : no hernias [Declined Rectal Exam] : declined rectal exam [Normal Supraclavicular Nodes] : no supraclavicular lymphadenopathy [Normal Axillary Nodes] : no axillary lymphadenopathy [Normal Posterior Cervical Nodes] : no posterior cervical lymphadenopathy [Normal Anterior Cervical Nodes] : no anterior cervical lymphadenopathy [Normal Inguinal Nodes] : no inguinal lymphadenopathy [Normal Femoral Nodes] : no femoral lymphadenopathy [No CVA Tenderness] : no CVA  tenderness [No Spinal Tenderness] : no spinal tenderness [No Joint Swelling] : no joint swelling [Grossly Normal Strength/Tone] : grossly normal strength/tone [No Rash] : no rash [No Skin Lesions] : no skin lesions [Coordination Grossly Intact] : coordination grossly intact [No Focal Deficits] : no focal deficits [Normal Gait] : normal gait [Deep Tendon Reflexes (DTR)] : deep tendon reflexes were 2+ and symmetric [Normal Affect] : the affect was normal [Normal Insight/Judgement] : insight and judgment were intact [Kyphosis] : no kyphosis [Scoliosis] : no scoliosis [Acne] : no acne

## 2022-03-16 NOTE — HISTORY OF PRESENT ILLNESS
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 benign essential hypertension and periods of depression patient denies temperature chills sweats or myalgias has had no headache sinus congestion sore throat cough wheezing pleurisy chest pain shortness of breath exertional dyspnea lightheadedness palpitations dizziness vertigo or syncope denies abdominal pain nausea vomiting diarrhea constipation bright red blood per rectum or black stools appetite has been good weight has been stable does urinate frequently and get up at night to urinate but denies dysuria or gross hematuria has occasional back pain denies leg edema skin rashes has periods of depression but reports his sleeping has been adequate recently

## 2022-03-16 NOTE — REVIEW OF SYSTEMS
[Nocturia] : nocturia [Frequency] : frequency [Impotence] : impotence [Back Pain] : back pain [Depression] : depression [Negative] : Heme/Lymph [Fever] : no fever [Chills] : no chills [Fatigue] : fatigue [Hot Flashes] : no hot flashes [Night Sweats] : no night sweats [Recent Change In Weight] : ~T no recent weight change [Discharge] : no discharge [Pain] : no pain [Redness] : no redness [Dryness] : no dryness [Vision Problems] : no vision problems [Itching] : no itching [Earache] : no earache [Hearing Loss] : no hearing loss [Nosebleeds] : no nosebleeds [Postnasal Drip] : no postnasal drip [Nasal Discharge] : no nasal discharge [Sore Throat] : no sore throat [Hoarseness] : no hoarseness [Chest Pain] : no chest pain [Palpitations] : no palpitations [Claudication] : no  leg claudication [Lower Ext Edema] : no lower extremity edema [Orthopena] : no orthopnea [Paroxysmal Nocturnal Dyspnea] : no paroxysmal nocturnal dyspnea [Shortness Of Breath] : no shortness of breath [Wheezing] : no wheezing [Cough] : no cough [Dyspnea on Exertion] : not dyspnea on exertion [Abdominal Pain] : no abdominal pain [Nausea] : no nausea [Constipation] : no constipation [Diarrhea] : no diarrhea [Vomiting] : no vomiting [Heartburn] : no heartburn [Melena] : no melena [Dysuria] : no dysuria [Incontinence] : no incontinence [Hesitancy] : no hesitancy [Hematuria] : no hematuria [Poor Libido] : libido not poor [Joint Pain] : no joint pain [Joint Stiffness] : no joint stiffness [Muscle Pain] : no muscle pain [Muscle Weakness] : no muscle weakness [Joint Swelling] : no joint swelling [Itching] : no itching [Mole Changes] : no mole changes [Nail Changes] : no nail changes [Hair Changes] : no hair changes [Skin Rash] : no skin rash [Headache] : no headache [Dizziness] : no dizziness [Fainting] : no fainting [Confusion] : no confusion [Unsteady Walk] : no ataxia [Memory Loss] : no memory loss [Suicidal] : not suicidal [Insomnia] : no insomnia [Anxiety] : no anxiety [Easy Bleeding] : no easy bleeding [Easy Bruising] : no easy bruising [Swollen Glands] : no swollen glands

## 2022-03-25 ENCOUNTER — APPOINTMENT (OUTPATIENT)
Dept: SURGERY | Facility: CLINIC | Age: 72
End: 2022-03-25
Payer: MEDICARE

## 2022-03-25 VITALS — WEIGHT: 188 LBS | HEIGHT: 72 IN | BODY MASS INDEX: 25.47 KG/M2 | TEMPERATURE: 98.2 F

## 2022-03-25 DIAGNOSIS — Z82.49 FAMILY HISTORY OF ISCHEMIC HEART DISEASE AND OTHER DISEASES OF THE CIRCULATORY SYSTEM: ICD-10-CM

## 2022-03-25 DIAGNOSIS — Z12.12 ENCOUNTER FOR SCREENING FOR MALIGNANT NEOPLASM OF COLON: ICD-10-CM

## 2022-03-25 DIAGNOSIS — Z12.11 ENCOUNTER FOR SCREENING FOR MALIGNANT NEOPLASM OF COLON: ICD-10-CM

## 2022-03-25 DIAGNOSIS — Z82.0 FAMILY HISTORY OF EPILEPSY AND OTHER DISEASES OF THE NERVOUS SYSTEM: ICD-10-CM

## 2022-03-25 DIAGNOSIS — R00.2 PALPITATIONS: ICD-10-CM

## 2022-03-25 PROCEDURE — 99202 OFFICE O/P NEW SF 15 MIN: CPT

## 2022-03-25 NOTE — REVIEW OF SYSTEMS
[Negative] : Gastrointestinal [Heart Rate Is Slow] : the heart rate was not slow [Chest Pain] : no chest pain

## 2022-03-25 NOTE — ASSESSMENT
[FreeTextEntry1] : Long discussion regarding all options and risks\par Bowel prep written and explained\par Scheduled for colonoscopy on 3/29/22\par All lab values and imaging studies reviewed\par Discussed with Medicine

## 2022-03-25 NOTE — PHYSICAL EXAM
[Normal Breath Sounds] : Normal breath sounds [Normal Heart Sounds] : normal heart sounds [Normal Rate and Rhythm] : normal rate and rhythm [No Rash or Lesion] : No rash or lesion [Abdominal Masses] : No abdominal masses [Abdomen Tenderness] : ~T ~M No abdominal tenderness [de-identified] : nl [de-identified] : nl [de-identified] : nl

## 2022-03-25 NOTE — HISTORY OF PRESENT ILLNESS
[de-identified] : This 71 year old man last underwent a colonoscopy over ten years ago. There is no FH of colon CA.

## 2022-03-28 ENCOUNTER — TRANSCRIPTION ENCOUNTER (OUTPATIENT)
Age: 72
End: 2022-03-28

## 2022-03-28 ENCOUNTER — OUTPATIENT (OUTPATIENT)
Dept: OUTPATIENT SERVICES | Facility: HOSPITAL | Age: 72
LOS: 1 days | End: 2022-03-28
Payer: MEDICARE

## 2022-03-28 DIAGNOSIS — Z20.828 CONTACT WITH AND (SUSPECTED) EXPOSURE TO OTHER VIRAL COMMUNICABLE DISEASES: ICD-10-CM

## 2022-03-28 LAB — SARS-COV-2 RNA SPEC QL NAA+PROBE: SIGNIFICANT CHANGE UP

## 2022-03-28 PROCEDURE — U0005: CPT

## 2022-03-28 PROCEDURE — U0003: CPT

## 2022-03-29 ENCOUNTER — APPOINTMENT (OUTPATIENT)
Dept: SURGERY | Facility: HOSPITAL | Age: 72
End: 2022-03-29

## 2022-03-29 ENCOUNTER — OUTPATIENT (OUTPATIENT)
Dept: OUTPATIENT SERVICES | Facility: HOSPITAL | Age: 72
LOS: 1 days | End: 2022-03-29
Payer: MEDICARE

## 2022-03-29 DIAGNOSIS — Z12.11 ENCOUNTER FOR SCREENING FOR MALIGNANT NEOPLASM OF COLON: ICD-10-CM

## 2022-03-29 PROCEDURE — G0121: CPT

## 2022-03-29 PROCEDURE — G0121 COLON CA SCRN NOT HI RSK IND: CPT

## 2022-03-29 RX ORDER — SODIUM CHLORIDE 9 MG/ML
500 INJECTION INTRAMUSCULAR; INTRAVENOUS; SUBCUTANEOUS
Refills: 0 | Status: COMPLETED | OUTPATIENT
Start: 2022-03-29 | End: 2022-03-29

## 2022-03-29 RX ADMIN — SODIUM CHLORIDE 75 MILLILITER(S): 9 INJECTION INTRAMUSCULAR; INTRAVENOUS; SUBCUTANEOUS at 09:04

## 2022-04-21 ENCOUNTER — APPOINTMENT (OUTPATIENT)
Dept: INTERNAL MEDICINE | Facility: CLINIC | Age: 72
End: 2022-04-21
Payer: MEDICARE

## 2022-04-21 VITALS
RESPIRATION RATE: 14 BRPM | HEART RATE: 102 BPM | DIASTOLIC BLOOD PRESSURE: 84 MMHG | SYSTOLIC BLOOD PRESSURE: 140 MMHG | OXYGEN SATURATION: 97 % | TEMPERATURE: 98 F

## 2022-04-21 PROCEDURE — 99215 OFFICE O/P EST HI 40 MIN: CPT

## 2022-04-21 RX ORDER — SODIUM PICOSULFATE, MAGNESIUM OXIDE, AND ANHYDROUS CITRIC ACID 10; 3.5; 12 MG/160ML; G/160ML; G/160ML
10-3.5-12 MG-GM LIQUID ORAL
Qty: 1 | Refills: 0 | Status: DISCONTINUED | COMMUNITY
Start: 2022-03-25 | End: 2022-04-21

## 2022-04-27 VITALS
RESPIRATION RATE: 16 BRPM | BODY MASS INDEX: 25.47 KG/M2 | WEIGHT: 188 LBS | HEIGHT: 72 IN | TEMPERATURE: 98 F | HEART RATE: 102 BPM | OXYGEN SATURATION: 97 %

## 2022-04-27 NOTE — ASSESSMENT
[FreeTextEntry1] : Physical examination reveals a well-developed  man in no acute distress pressure 140/80 pulse of 102 respirations 16 temperature 98 °F height 6 feet weight 188 pounds BMI 25.5 HEENT was unremarkable chest was clear cardiovascular exam was regular abdomen was soft and nontender extremities showed no clubbing cyanosis or edema neurologic exam with was nonfocal patient's blood pressure slightly elevated and his metoprolol was increased to 50 mg succinate a day  he follows regularly with his ophthalmologist and retinologist for his ocular nevus he is overdue on complete blood tests which were ordered including CBC full chemistries lipid profile thyroid profile urinalysis CRP vitamins D3 and B12 hemoglobin A1c COVID-19 nuclear capsid and spike domain antibodies and PSA patient was given a slip for chest x-ray in March 2022 patient's anxiety and depression has improved significantly with the addition of sertraline 50 mg daily

## 2022-04-27 NOTE — HEALTH RISK ASSESSMENT
[Never] : Never [Yes] : Yes [No falls in past year] : Patient reported no falls in the past year [0] : 2) Feeling down, depressed, or hopeless: Not at all (0) [PHQ-2 Negative - No further assessment needed] : PHQ-2 Negative - No further assessment needed [de-identified] : occasional [[id number]] : 0

## 2022-04-27 NOTE — REVIEW OF SYSTEMS
[Fatigue] : fatigue [Nocturia] : nocturia [Frequency] : frequency [Impotence] : impotence [Back Pain] : back pain [Depression] : depression [Negative] : Heme/Lymph [Fever] : no fever [Chills] : no chills [Hot Flashes] : no hot flashes [Night Sweats] : no night sweats [Recent Change In Weight] : ~T no recent weight change [Discharge] : no discharge [Pain] : no pain [Redness] : no redness [Dryness] : no dryness [Vision Problems] : no vision problems [Itching] : no itching [Earache] : no earache [Hearing Loss] : no hearing loss [Nosebleeds] : no nosebleeds [Postnasal Drip] : no postnasal drip [Nasal Discharge] : no nasal discharge [Sore Throat] : no sore throat [Hoarseness] : no hoarseness [Chest Pain] : no chest pain [Palpitations] : no palpitations [Claudication] : no  leg claudication [Lower Ext Edema] : no lower extremity edema [Orthopena] : no orthopnea [Paroxysmal Nocturnal Dyspnea] : no paroxysmal nocturnal dyspnea [Shortness Of Breath] : no shortness of breath [Wheezing] : no wheezing [Cough] : no cough [Dyspnea on Exertion] : not dyspnea on exertion [Abdominal Pain] : no abdominal pain [Nausea] : no nausea [Constipation] : no constipation [Diarrhea] : no diarrhea [Vomiting] : no vomiting [Heartburn] : no heartburn [Melena] : no melena [Dysuria] : no dysuria [Incontinence] : no incontinence [Hesitancy] : no hesitancy [Hematuria] : no hematuria [Poor Libido] : libido not poor [Joint Pain] : no joint pain [Joint Stiffness] : no joint stiffness [Muscle Pain] : no muscle pain [Muscle Weakness] : no muscle weakness [Joint Swelling] : no joint swelling [Mole Changes] : no mole changes [Nail Changes] : no nail changes [Hair Changes] : no hair changes [Skin Rash] : no skin rash [Headache] : no headache [Dizziness] : no dizziness [Fainting] : no fainting [Confusion] : no confusion [Unsteady Walk] : no ataxia [Memory Loss] : no memory loss [Suicidal] : not suicidal [Insomnia] : no insomnia [Anxiety] : no anxiety [Easy Bleeding] : no easy bleeding [Easy Bruising] : no easy bruising [Swollen Glands] : no swollen glands

## 2022-04-27 NOTE — HISTORY OF PRESENT ILLNESS
[FreeTextEntry1] : 71-year-old man comes to the office for follow-up to review his medications and discuss his overall health recent issues with frequent urination and and mildly elevated blood pressures recently [de-identified] : Comes to the office for follow-up with a history of benign essential hypertension depression fatigue and ocular nevus of the right denies temperature chills sweats or myalgias has had no headache sinus congestion sore throat cough wheezing pleurisy chest pain shortness of breath exertional dyspnea lightheadedness palpitations dizziness vertigo or syncope denies abdominal pain nausea vomiting diarrhea constipation bright red blood per rectum or black stools appetite has been good weight has been stable does urinate frequently and gets up at night to urinate but denies dysuria or gross hematuria has occasional lower back pain denies leg edema skin rashes has episodes of depression and has been sleeping adequately recently

## 2022-06-03 LAB
25(OH)D3 SERPL-MCNC: 39.8 NG/ML
ALBUMIN SERPL ELPH-MCNC: 4.2 G/DL
ALP BLD-CCNC: 69 U/L
ALT SERPL-CCNC: 22 U/L
ANION GAP SERPL CALC-SCNC: 11 MMOL/L
APPEARANCE: CLEAR
AST SERPL-CCNC: 22 U/L
BASOPHILS # BLD AUTO: 0.07 K/UL
BASOPHILS NFR BLD AUTO: 1 %
BILIRUB SERPL-MCNC: 0.5 MG/DL
BILIRUBIN URINE: NEGATIVE
BLOOD URINE: NEGATIVE
BUN SERPL-MCNC: 16 MG/DL
CALCIUM SERPL-MCNC: 9.3 MG/DL
CHLORIDE SERPL-SCNC: 105 MMOL/L
CHOLEST SERPL-MCNC: 187 MG/DL
CO2 SERPL-SCNC: 28 MMOL/L
COLOR: NORMAL
COVID-19 NUCLEOCAPSID  GAM ANTIBODY INTERPRETATION: NEGATIVE
COVID-19 SPIKE DOMAIN ANTIBODY INTERPRETATION: POSITIVE
CREAT SERPL-MCNC: 0.98 MG/DL
CRP SERPL HS-MCNC: 0.26 MG/L
EGFR: 82 ML/MIN/1.73M2
EOSINOPHIL # BLD AUTO: 0.55 K/UL
EOSINOPHIL NFR BLD AUTO: 7.5 %
ESTIMATED AVERAGE GLUCOSE: 126 MG/DL
GLUCOSE BS SERPL-MCNC: 116 MG/DL
GLUCOSE QUALITATIVE U: NEGATIVE
GLUCOSE SERPL-MCNC: 122 MG/DL
HBA1C MFR BLD HPLC: 6 %
HCT VFR BLD CALC: 44.6 %
HDLC SERPL-MCNC: 61 MG/DL
HGB BLD-MCNC: 14.3 G/DL
IMM GRANULOCYTES NFR BLD AUTO: 0.5 %
KETONES URINE: NEGATIVE
LDLC SERPL CALC-MCNC: 113 MG/DL
LEUKOCYTE ESTERASE URINE: NEGATIVE
LYMPHOCYTES # BLD AUTO: 2.21 K/UL
LYMPHOCYTES NFR BLD AUTO: 30.1 %
MAN DIFF?: NORMAL
MCHC RBC-ENTMCNC: 29.1 PG
MCHC RBC-ENTMCNC: 32.1 GM/DL
MCV RBC AUTO: 90.7 FL
MONOCYTES # BLD AUTO: 0.58 K/UL
MONOCYTES NFR BLD AUTO: 7.9 %
NEUTROPHILS # BLD AUTO: 3.89 K/UL
NEUTROPHILS NFR BLD AUTO: 53 %
NITRITE URINE: NEGATIVE
NONHDLC SERPL-MCNC: 126 MG/DL
PH URINE: 5.5
PLATELET # BLD AUTO: 221 K/UL
POTASSIUM SERPL-SCNC: 4.6 MMOL/L
PROT SERPL-MCNC: 6.5 G/DL
PROTEIN URINE: NEGATIVE
PSA FREE FLD-MCNC: 28 %
PSA FREE SERPL-MCNC: 0.14 NG/ML
PSA SERPL-MCNC: 0.5 NG/ML
RBC # BLD: 4.92 M/UL
RBC # FLD: 12.7 %
SARS-COV-2 AB SERPL IA-ACNC: 243 U/ML
SARS-COV-2 AB SERPL QL IA: 0.11 INDEX
SODIUM SERPL-SCNC: 144 MMOL/L
SPECIFIC GRAVITY URINE: 1.02
T4 FREE SERPL-MCNC: 1.4 NG/DL
TRIGL SERPL-MCNC: 66 MG/DL
TSH SERPL-ACNC: 4.82 UIU/ML
UROBILINOGEN URINE: NORMAL
VIT B12 SERPL-MCNC: 819 PG/ML
WBC # FLD AUTO: 7.34 K/UL

## 2022-08-15 ENCOUNTER — RX RENEWAL (OUTPATIENT)
Age: 72
End: 2022-08-15

## 2022-12-04 ENCOUNTER — RX RENEWAL (OUTPATIENT)
Age: 72
End: 2022-12-04

## 2023-01-06 ENCOUNTER — EMERGENCY (EMERGENCY)
Facility: HOSPITAL | Age: 73
LOS: 1 days | Discharge: HOME | End: 2023-01-06
Attending: INTERNAL MEDICINE | Admitting: EMERGENCY MEDICINE
Payer: MEDICARE

## 2023-01-06 VITALS
DIASTOLIC BLOOD PRESSURE: 90 MMHG | WEIGHT: 179.9 LBS | RESPIRATION RATE: 18 BRPM | OXYGEN SATURATION: 96 % | SYSTOLIC BLOOD PRESSURE: 175 MMHG | HEIGHT: 72 IN | HEART RATE: 90 BPM | TEMPERATURE: 99 F

## 2023-01-06 VITALS
HEART RATE: 74 BPM | SYSTOLIC BLOOD PRESSURE: 148 MMHG | RESPIRATION RATE: 16 BRPM | DIASTOLIC BLOOD PRESSURE: 76 MMHG | OXYGEN SATURATION: 95 %

## 2023-01-06 LAB
ALBUMIN SERPL ELPH-MCNC: 3.4 G/DL — SIGNIFICANT CHANGE UP (ref 3.3–5)
ALP SERPL-CCNC: 80 U/L — SIGNIFICANT CHANGE UP (ref 40–120)
ALT FLD-CCNC: 24 U/L — SIGNIFICANT CHANGE UP (ref 10–45)
ANION GAP SERPL CALC-SCNC: 6 MMOL/L — SIGNIFICANT CHANGE UP (ref 5–17)
AST SERPL-CCNC: 20 U/L — SIGNIFICANT CHANGE UP (ref 10–40)
BASOPHILS # BLD AUTO: 0.06 K/UL — SIGNIFICANT CHANGE UP (ref 0–0.2)
BASOPHILS NFR BLD AUTO: 0.6 % — SIGNIFICANT CHANGE UP (ref 0–2)
BILIRUB SERPL-MCNC: 0.8 MG/DL — SIGNIFICANT CHANGE UP (ref 0.2–1.2)
BUN SERPL-MCNC: 15 MG/DL — SIGNIFICANT CHANGE UP (ref 7–23)
CALCIUM SERPL-MCNC: 8.8 MG/DL — SIGNIFICANT CHANGE UP (ref 8.4–10.5)
CHLORIDE SERPL-SCNC: 103 MMOL/L — SIGNIFICANT CHANGE UP (ref 96–108)
CO2 SERPL-SCNC: 28 MMOL/L — SIGNIFICANT CHANGE UP (ref 22–31)
CREAT SERPL-MCNC: 0.96 MG/DL — SIGNIFICANT CHANGE UP (ref 0.5–1.3)
EGFR: 83 ML/MIN/1.73M2 — SIGNIFICANT CHANGE UP
EOSINOPHIL # BLD AUTO: 0.4 K/UL — SIGNIFICANT CHANGE UP (ref 0–0.5)
EOSINOPHIL NFR BLD AUTO: 3.9 % — SIGNIFICANT CHANGE UP (ref 0–6)
GLUCOSE SERPL-MCNC: 122 MG/DL — HIGH (ref 70–99)
HCT VFR BLD CALC: 44 % — SIGNIFICANT CHANGE UP (ref 39–50)
HGB BLD-MCNC: 14.8 G/DL — SIGNIFICANT CHANGE UP (ref 13–17)
IMM GRANULOCYTES NFR BLD AUTO: 0.6 % — SIGNIFICANT CHANGE UP (ref 0–0.9)
LYMPHOCYTES # BLD AUTO: 1.2 K/UL — SIGNIFICANT CHANGE UP (ref 1–3.3)
LYMPHOCYTES # BLD AUTO: 11.7 % — LOW (ref 13–44)
MCHC RBC-ENTMCNC: 30 PG — SIGNIFICANT CHANGE UP (ref 27–34)
MCHC RBC-ENTMCNC: 33.6 GM/DL — SIGNIFICANT CHANGE UP (ref 32–36)
MCV RBC AUTO: 89.2 FL — SIGNIFICANT CHANGE UP (ref 80–100)
MONOCYTES # BLD AUTO: 0.96 K/UL — HIGH (ref 0–0.9)
MONOCYTES NFR BLD AUTO: 9.4 % — SIGNIFICANT CHANGE UP (ref 2–14)
NEUTROPHILS # BLD AUTO: 7.54 K/UL — HIGH (ref 1.8–7.4)
NEUTROPHILS NFR BLD AUTO: 73.8 % — SIGNIFICANT CHANGE UP (ref 43–77)
NRBC # BLD: 0 /100 WBCS — SIGNIFICANT CHANGE UP (ref 0–0)
NT-PROBNP SERPL-SCNC: 133 PG/ML — SIGNIFICANT CHANGE UP (ref 0–300)
PLATELET # BLD AUTO: 208 K/UL — SIGNIFICANT CHANGE UP (ref 150–400)
POTASSIUM SERPL-MCNC: 4.1 MMOL/L — SIGNIFICANT CHANGE UP (ref 3.5–5.3)
POTASSIUM SERPL-SCNC: 4.1 MMOL/L — SIGNIFICANT CHANGE UP (ref 3.5–5.3)
PROT SERPL-MCNC: 6.6 G/DL — SIGNIFICANT CHANGE UP (ref 6–8.3)
RBC # BLD: 4.93 M/UL — SIGNIFICANT CHANGE UP (ref 4.2–5.8)
RBC # FLD: 12.5 % — SIGNIFICANT CHANGE UP (ref 10.3–14.5)
SARS-COV-2 RNA SPEC QL NAA+PROBE: SIGNIFICANT CHANGE UP
SODIUM SERPL-SCNC: 137 MMOL/L — SIGNIFICANT CHANGE UP (ref 135–145)
TROPONIN I, HIGH SENSITIVITY RESULT: 7.2 NG/L — SIGNIFICANT CHANGE UP
TROPONIN I, HIGH SENSITIVITY RESULT: 7.9 NG/L — SIGNIFICANT CHANGE UP
WBC # BLD: 10.22 K/UL — SIGNIFICANT CHANGE UP (ref 3.8–10.5)
WBC # FLD AUTO: 10.22 K/UL — SIGNIFICANT CHANGE UP (ref 3.8–10.5)

## 2023-01-06 PROCEDURE — 80053 COMPREHEN METABOLIC PANEL: CPT

## 2023-01-06 PROCEDURE — 87635 SARS-COV-2 COVID-19 AMP PRB: CPT

## 2023-01-06 PROCEDURE — 99285 EMERGENCY DEPT VISIT HI MDM: CPT | Mod: 25

## 2023-01-06 PROCEDURE — 93005 ELECTROCARDIOGRAM TRACING: CPT

## 2023-01-06 PROCEDURE — 83880 ASSAY OF NATRIURETIC PEPTIDE: CPT

## 2023-01-06 PROCEDURE — 71045 X-RAY EXAM CHEST 1 VIEW: CPT | Mod: 26

## 2023-01-06 PROCEDURE — 84484 ASSAY OF TROPONIN QUANT: CPT

## 2023-01-06 PROCEDURE — 71045 X-RAY EXAM CHEST 1 VIEW: CPT

## 2023-01-06 PROCEDURE — 36415 COLL VENOUS BLD VENIPUNCTURE: CPT

## 2023-01-06 PROCEDURE — 99285 EMERGENCY DEPT VISIT HI MDM: CPT | Mod: CS

## 2023-01-06 PROCEDURE — 85025 COMPLETE CBC W/AUTO DIFF WBC: CPT

## 2023-01-06 PROCEDURE — 93010 ELECTROCARDIOGRAM REPORT: CPT

## 2023-01-06 RX ORDER — METOPROLOL TARTRATE 50 MG
1 TABLET ORAL
Qty: 0 | Refills: 0 | DISCHARGE

## 2023-01-06 RX ORDER — ASPIRIN/CALCIUM CARB/MAGNESIUM 324 MG
324 TABLET ORAL ONCE
Refills: 0 | Status: COMPLETED | OUTPATIENT
Start: 2023-01-06 | End: 2023-01-06

## 2023-01-06 RX ORDER — IRBESARTAN 75 MG/1
1 TABLET ORAL
Qty: 0 | Refills: 0 | DISCHARGE

## 2023-01-06 RX ADMIN — Medication 324 MILLIGRAM(S): at 13:05

## 2023-01-06 NOTE — ED PROVIDER NOTE - CLINICAL SUMMARY MEDICAL DECISION MAKING FREE TEXT BOX
72-year-old male with a past history of hypertension complains of having the pain in the left side of the chest going on for about 3 days initially she states that it is off and on worse with coughing and now it is constant she takes metoprolol the only medication for high blood pressure denies any sweating nausea vomiting or lightheadedness denies any cardiac history Dr. Pena is a primary care doctor patient denies any radiation of the pain to the left shoulder or the upper back patient is COVID vaccinated non-smoker  Patient exam is unremarkable EKG is normal sinus rhythm at 71 chest x-ray with troponins labs ordered aspirin 324 mg ordered will reevaluate when all the results come back 72-year-old male with a past history of hypertension complains of having the pain in the left side of the chest going on for about 3 days initially she states that it is off and on worse with coughing and now it is constant she takes metoprolol the only medication for high blood pressure denies any sweating nausea vomiting or lightheadedness denies any cardiac history Dr. Salomon is a primary care doctor patient denies any radiation of the pain to the left shoulder or the upper back patient is COVID vaccinated non-smoker  Patient exam is unremarkable EKG is normal sinus rhythm at 71 chest x-ray with troponins labs ordered aspirin 324 mg ordered will reevaluate when all the results come back  trops times 2 neg case d/w dr salomon recommended out pt cardiology follow up

## 2023-01-06 NOTE — ED PROVIDER NOTE - PATIENT PORTAL LINK FT
You can access the FollowMyHealth Patient Portal offered by [facility] by registering at the following website: [email or web address]. By joining [facility]’s FollowMyHealth portal, you will also be able to view your health information using other applications (apps) compatible with our system.

## 2023-01-06 NOTE — ED PROVIDER NOTE - CARE PROVIDER_API CALL
Lee Leahy  CARDIOLOGY  [address], [address]  [place] [address]  Phone: [phone]  Fax: [phone]  Follow Up Time:

## 2023-01-06 NOTE — ED PROVIDER NOTE - OBJECTIVE STATEMENT
72-year-old male with a past history of hypertension complains of having the pain in the left side of the chest going on for about 3 days initially she states that it is off and on worse with coughing and now it is constant she takes metoprolol the only medication for high blood pressure denies any sweating nausea vomiting or lightheadedness denies any cardiac history Dr. Pena is a primary care doctor patient denies any radiation of the pain to the left shoulder or the upper back patient is COVID vaccinated non-smoker

## 2023-01-09 NOTE — CHART NOTE - NSCHARTNOTEFT_GEN_A_CORE
SW placed call to patient to discuss and assist with follow up care.  Patient presented to ED on 1/6/23 for chest pain.  Patient declined SW assistance at this time with scheduling follow up appointment with cardiology.  SW encouraged patient to call ED SW if further assistance is needed.

## 2023-02-15 NOTE — ED PROVIDER NOTE - CARE PLAN
Goal:	chest pain   1 Principal Discharge DX:	Atypical chest pain  Goal:	chest pain   Dutasteride Male Counseling: Dustasteride Counseling:  I discussed with the patient the risks of use of dutasteride including but not limited to decreased libido, decreased ejaculate volume, and gynecomastia. Women who can become pregnant should not handle medication.  All of the patient's questions and concerns were addressed. Dutasteride Counseling: Dustasteride Counseling:  I discussed with the patient the risks of use of dutasteride including but not limited to decreased libido, decreased ejaculate volume, and gynecomastia. Women who can become pregnant should not handle medication.  All of the patient's questions and concerns were addressed.

## 2023-03-22 ENCOUNTER — APPOINTMENT (OUTPATIENT)
Dept: INTERNAL MEDICINE | Facility: CLINIC | Age: 73
End: 2023-03-22
Payer: MEDICARE

## 2023-03-22 VITALS
RESPIRATION RATE: 16 BRPM | DIASTOLIC BLOOD PRESSURE: 76 MMHG | SYSTOLIC BLOOD PRESSURE: 136 MMHG | WEIGHT: 189 LBS | HEIGHT: 72 IN | TEMPERATURE: 97.8 F | HEART RATE: 84 BPM | OXYGEN SATURATION: 97 % | BODY MASS INDEX: 25.6 KG/M2

## 2023-03-22 DIAGNOSIS — R53.83 OTHER FATIGUE: ICD-10-CM

## 2023-03-22 DIAGNOSIS — D31.91 BENIGN NEOPLASM OF UNSPECIFIED PART OF RIGHT EYE: ICD-10-CM

## 2023-03-22 DIAGNOSIS — M10.471 OTHER SECONDARY GOUT, RIGHT ANKLE AND FOOT: ICD-10-CM

## 2023-03-22 DIAGNOSIS — F32.A DEPRESSION, UNSPECIFIED: ICD-10-CM

## 2023-03-22 PROCEDURE — 99215 OFFICE O/P EST HI 40 MIN: CPT

## 2023-03-22 RX ORDER — INDOMETHACIN 25 MG/1
25 CAPSULE ORAL
Qty: 60 | Refills: 1 | Status: ACTIVE | COMMUNITY
Start: 2023-03-22 | End: 1900-01-01

## 2023-03-22 RX ORDER — METOPROLOL SUCCINATE 50 MG/1
50 TABLET, EXTENDED RELEASE ORAL
Qty: 30 | Refills: 5 | Status: ACTIVE | COMMUNITY
Start: 2022-04-21 | End: 1900-01-01

## 2023-04-13 PROBLEM — D31.91: Status: ACTIVE | Noted: 2019-10-22

## 2023-04-13 NOTE — PHYSICAL EXAM
[No Acute Distress] : no acute distress [Well Nourished] : well nourished [Well Developed] : well developed [Well-Appearing] : well-appearing [Normal Voice/Communication] : normal voice/communication [Normal Sclera/Conjunctiva] : normal sclera/conjunctiva [PERRL] : pupils equal round and reactive to light [EOMI] : extraocular movements intact [Normal Outer Ear/Nose] : the outer ears and nose were normal in appearance [Normal Oropharynx] : the oropharynx was normal [Normal TMs] : both tympanic membranes were normal [Normal Nasal Mucosa] : the nasal mucosa was normal [No JVD] : no jugular venous distention [No Lymphadenopathy] : no lymphadenopathy [Supple] : supple [Thyroid Normal, No Nodules] : the thyroid was normal and there were no nodules present [No Respiratory Distress] : no respiratory distress  [No Accessory Muscle Use] : no accessory muscle use [Clear to Auscultation] : lungs were clear to auscultation bilaterally [Normal Rate] : normal rate  [Regular Rhythm] : with a regular rhythm [Normal S1, S2] : normal S1 and S2 [No Murmur] : no murmur heard [No Carotid Bruits] : no carotid bruits [No Abdominal Bruit] : a ~M bruit was not heard ~T in the abdomen [No Varicosities] : no varicosities [Pedal Pulses Present] : the pedal pulses are present [No Edema] : there was no peripheral edema [No Palpable Aorta] : no palpable aorta [No Extremity Clubbing/Cyanosis] : no extremity clubbing/cyanosis [Declined Breast Exam] : declined breast exam  [Soft] : abdomen soft [Non Tender] : non-tender [Non-distended] : non-distended [No Masses] : no abdominal mass palpated [No HSM] : no HSM [Normal Bowel Sounds] : normal bowel sounds [No Hernias] : no hernias [Declined Rectal Exam] : declined rectal exam [No CVA Tenderness] : no CVA  tenderness [No Spinal Tenderness] : no spinal tenderness [No Joint Swelling] : no joint swelling [Grossly Normal Strength/Tone] : grossly normal strength/tone [No Rash] : no rash [No Skin Lesions] : no skin lesions [Coordination Grossly Intact] : coordination grossly intact [No Focal Deficits] : no focal deficits [Normal Gait] : normal gait [Deep Tendon Reflexes (DTR)] : deep tendon reflexes were 2+ and symmetric [Normal Affect] : the affect was normal [Normal Insight/Judgement] : insight and judgment were intact [Kyphosis] : no kyphosis [Scoliosis] : no scoliosis [Acne] : no acne

## 2023-04-13 NOTE — HEALTH RISK ASSESSMENT
[Yes] : Yes [No falls in past year] : Patient reported no falls in the past year [0] : 2) Feeling down, depressed, or hopeless: Not at all (0) [PHQ-2 Negative - No further assessment needed] : PHQ-2 Negative - No further assessment needed [de-identified] : Occasional [[id number]] : 0 [Never] : Never

## 2023-04-13 NOTE — ASSESSMENT
[FreeTextEntry1] : Physical examination shows a well-developed man in no acute distress blood pressure 136/76 pulse of 84 respirations at 16 oxygen saturation on room air 97% HEENT was unremarkable chest was clear cardiovascular exam was regular with no extra heart sounds or murmurs abdomen was soft extremities showed no edema neurologic exam was nonfocal patient of right toe was tender swollen and hot compatible with gout patient was given a prescription for indomethacin with a tapering schedule patient was given a slip for complete blood test including CBC PSA hemoglobin A1c vitamin B12 TSH free T4 vitamin D comprehensive metabolic profile C-reactive protein lipid profile uric acid fasting blood sugar urinalysis blood pressure is well controlled at the present visit patient's depression has been minimal recently and he has been in good spirits patient follows regularly with ophthalmology concerning his ocular nevus and dermatology patient had a colonoscopy with Dr. Cook in 2022

## 2023-04-13 NOTE — REVIEW OF SYSTEMS
[Fatigue] : fatigue [Nocturia] : nocturia [Frequency] : frequency [Impotence] : impotence [Back Pain] : back pain [Depression] : depression [Negative] : Heme/Lymph [Fever] : no fever [Chills] : no chills [Hot Flashes] : no hot flashes [Night Sweats] : no night sweats [Recent Change In Weight] : ~T no recent weight change [Discharge] : no discharge [Pain] : no pain [Redness] : no redness [Dryness] : no dryness [Vision Problems] : no vision problems [Itching] : no itching [Earache] : no earache [Hearing Loss] : no hearing loss [Nosebleeds] : no nosebleeds [Postnasal Drip] : no postnasal drip [Nasal Discharge] : no nasal discharge [Sore Throat] : no sore throat [Hoarseness] : no hoarseness [Chest Pain] : no chest pain [Palpitations] : no palpitations [Claudication] : no  leg claudication [Lower Ext Edema] : no lower extremity edema [Orthopena] : no orthopnea [Paroxysmal Nocturnal Dyspnea] : no paroxysmal nocturnal dyspnea [Shortness Of Breath] : no shortness of breath [Wheezing] : no wheezing [Cough] : no cough [Dyspnea on Exertion] : not dyspnea on exertion [Abdominal Pain] : no abdominal pain [Nausea] : no nausea [Constipation] : no constipation [Diarrhea] : no diarrhea [Vomiting] : no vomiting [Heartburn] : no heartburn [Melena] : no melena [Dysuria] : no dysuria [Incontinence] : no incontinence [Hesitancy] : no hesitancy [Hematuria] : no hematuria [Poor Libido] : libido not poor [Joint Pain] : no joint pain [Joint Stiffness] : no joint stiffness [Muscle Pain] : no muscle pain [Muscle Weakness] : no muscle weakness [Joint Swelling] : no joint swelling [Itching] : no itching [Mole Changes] : no mole changes [Nail Changes] : no nail changes [Hair Changes] : no hair changes [Skin Rash] : no skin rash [Headache] : no headache [Dizziness] : no dizziness [Fainting] : no fainting [Confusion] : no confusion [Unsteady Walk] : no ataxia [Memory Loss] : no memory loss [Suicidal] : not suicidal [Insomnia] : no insomnia [Anxiety] : no anxiety [Easy Bleeding] : no easy bleeding [Easy Bruising] : no easy bruising [Swollen Glands] : no swollen glands

## 2023-04-13 NOTE — HISTORY OF PRESENT ILLNESS
[FreeTextEntry1] : 72-year-old man comes to the office for follow-up to review her medications and discuss her overall health recent issues with a painful base of his right toe [de-identified] : Comes to the office for follow-up with a history of ocular nevus of the right eye fatigue depression benign essential hypertension recently complaining of pain at the base of the big toe of the right foot review of systems is otherwise significant for fatigue urinary frequency and nocturia impotence back pain depression remaining review of systems is noncontributory

## 2023-04-18 ENCOUNTER — APPOINTMENT (OUTPATIENT)
Dept: CARDIOLOGY | Facility: CLINIC | Age: 73
End: 2023-04-18
Payer: MEDICARE

## 2023-04-18 ENCOUNTER — NON-APPOINTMENT (OUTPATIENT)
Age: 73
End: 2023-04-18

## 2023-04-18 VITALS — SYSTOLIC BLOOD PRESSURE: 164 MMHG | DIASTOLIC BLOOD PRESSURE: 82 MMHG

## 2023-04-18 VITALS
BODY MASS INDEX: 25.6 KG/M2 | DIASTOLIC BLOOD PRESSURE: 90 MMHG | SYSTOLIC BLOOD PRESSURE: 160 MMHG | OXYGEN SATURATION: 96 % | HEIGHT: 72 IN | HEART RATE: 55 BPM | WEIGHT: 189 LBS

## 2023-04-18 DIAGNOSIS — R07.9 CHEST PAIN, UNSPECIFIED: ICD-10-CM

## 2023-04-18 DIAGNOSIS — I10 ESSENTIAL (PRIMARY) HYPERTENSION: ICD-10-CM

## 2023-04-18 DIAGNOSIS — R73.09 OTHER ABNORMAL GLUCOSE: ICD-10-CM

## 2023-04-18 PROCEDURE — 93000 ELECTROCARDIOGRAM COMPLETE: CPT

## 2023-04-18 PROCEDURE — 99204 OFFICE O/P NEW MOD 45 MIN: CPT

## 2023-04-18 PROCEDURE — 93306 TTE W/DOPPLER COMPLETE: CPT

## 2023-04-18 RX ORDER — ALLOPURINOL 300 MG/1
300 TABLET ORAL DAILY
Qty: 30 | Refills: 5 | Status: ACTIVE | COMMUNITY
Start: 2023-04-18 | End: 1900-01-01

## 2023-04-18 NOTE — HISTORY OF PRESENT ILLNESS
[FreeTextEntry1] : 72-year-old man presents for evaluation.  He reports he has a relatively chronic discomfort in the left anterior chest.  Episodes can last all day and are not precipitated by activity motion breathing.  He had an ER visit with the same discomforts and was sent home with advised to have Cardiologic evaluation.\par \par He has no history of heart disease in the past but is treated for hypertension.  He was recently found to have gout which was treated.\par \par He is a non-smoker has alcohol socially is not active physically and does not exercise.\par \par His diet is generally of good quality.

## 2023-04-18 NOTE — DISCUSSION/SUMMARY
[Patient] : the patient [Risks] : risks [Benefits] : benefits [Alternatives] : alternatives [___ Month(s)] : in [unfilled] month(s) [FreeTextEntry1] : Discussed with patient counseled on appropriate diet.  Discussed cardiac risk additional antihypertensive therapy with valsartan allopurinol due to history of elevated uric acid and gout in setting of hypertension, and lipid-lowering therapy.  Present for nuclear stress testing schedule echo.  Reviewed ER records including negative chest x-ray negative troponin.  Discussed with him in detail. [EKG obtained to assist in diagnosis and management of assessed problem(s)] : EKG obtained to assist in diagnosis and management of assessed problem(s)

## 2023-04-18 NOTE — ASSESSMENT
[FreeTextEntry1] : Elevated cardiac risk.  Hypertension suboptimal control.  Dyslipidemia.  Chest pain likely noncardiac.  History of gout.

## 2023-04-18 NOTE — REASON FOR VISIT
[Symptom and Test Evaluation] : symptom and test evaluation [Hypertension] : hypertension [FreeTextEntry3] : [place]ta

## 2023-04-27 ENCOUNTER — APPOINTMENT (OUTPATIENT)
Dept: CARDIOLOGY | Facility: CLINIC | Age: 73
End: 2023-04-27
Payer: MEDICARE

## 2023-04-27 PROCEDURE — A9500: CPT

## 2023-04-27 PROCEDURE — 78452 HT MUSCLE IMAGE SPECT MULT: CPT

## 2023-04-27 PROCEDURE — 93015 CV STRESS TEST SUPVJ I&R: CPT

## 2023-04-28 LAB
25(OH)D3 SERPL-MCNC: 29.8 NG/ML
ALBUMIN SERPL ELPH-MCNC: 4.3 G/DL
ALP BLD-CCNC: 69 U/L
ALT SERPL-CCNC: 26 U/L
ANION GAP SERPL CALC-SCNC: 12 MMOL/L
APPEARANCE: CLEAR
AST SERPL-CCNC: 20 U/L
BASOPHILS # BLD AUTO: 0.08 K/UL
BASOPHILS NFR BLD AUTO: 1.1 %
BILIRUB SERPL-MCNC: 0.5 MG/DL
BILIRUBIN URINE: NEGATIVE
BLOOD URINE: NEGATIVE
BUN SERPL-MCNC: 21 MG/DL
CALCIUM SERPL-MCNC: 9.3 MG/DL
CHLORIDE SERPL-SCNC: 105 MMOL/L
CHOLEST SERPL-MCNC: 196 MG/DL
CO2 SERPL-SCNC: 26 MMOL/L
COLOR: NORMAL
CREAT SERPL-MCNC: 1.14 MG/DL
CRP SERPL HS-MCNC: 0.29 MG/L
EGFR: 68 ML/MIN/1.73M2
EOSINOPHIL # BLD AUTO: 0.53 K/UL
EOSINOPHIL NFR BLD AUTO: 7.2 %
ESTIMATED AVERAGE GLUCOSE: 128 MG/DL
GLUCOSE BS SERPL-MCNC: 99 MG/DL
GLUCOSE QUALITATIVE U: NEGATIVE
GLUCOSE SERPL-MCNC: 102 MG/DL
HBA1C MFR BLD HPLC: 6.1 %
HCT VFR BLD CALC: 45 %
HDLC SERPL-MCNC: 59 MG/DL
HGB BLD-MCNC: 14.8 G/DL
IMM GRANULOCYTES NFR BLD AUTO: 0.7 %
KETONES URINE: NEGATIVE
LDLC SERPL CALC-MCNC: 125 MG/DL
LEUKOCYTE ESTERASE URINE: NEGATIVE
LYMPHOCYTES # BLD AUTO: 2.12 K/UL
LYMPHOCYTES NFR BLD AUTO: 28.7 %
MAN DIFF?: NORMAL
MCHC RBC-ENTMCNC: 29.9 PG
MCHC RBC-ENTMCNC: 32.9 GM/DL
MCV RBC AUTO: 90.9 FL
MONOCYTES # BLD AUTO: 0.59 K/UL
MONOCYTES NFR BLD AUTO: 8 %
NEUTROPHILS # BLD AUTO: 4.02 K/UL
NEUTROPHILS NFR BLD AUTO: 54.3 %
NITRITE URINE: NEGATIVE
NONHDLC SERPL-MCNC: 137 MG/DL
PH URINE: 5.5
PLATELET # BLD AUTO: 231 K/UL
POTASSIUM SERPL-SCNC: 4.8 MMOL/L
PROT SERPL-MCNC: 6.2 G/DL
PROTEIN URINE: NEGATIVE
PSA FREE FLD-MCNC: 24 %
PSA FREE SERPL-MCNC: 0.15 NG/ML
PSA SERPL-MCNC: 0.62 NG/ML
RBC # BLD: 4.95 M/UL
RBC # FLD: 12.8 %
SODIUM SERPL-SCNC: 142 MMOL/L
SPECIFIC GRAVITY URINE: 1.02
T4 FREE SERPL-MCNC: 1.2 NG/DL
TRIGL SERPL-MCNC: 60 MG/DL
TSH SERPL-ACNC: 4.06 UIU/ML
URATE SERPL-MCNC: 7.5 MG/DL
UROBILINOGEN URINE: NORMAL
VIT B12 SERPL-MCNC: 341 PG/ML
WBC # FLD AUTO: 7.39 K/UL

## 2023-07-17 RX ORDER — ATORVASTATIN CALCIUM 20 MG/1
20 TABLET, FILM COATED ORAL
Qty: 30 | Refills: 4 | Status: ACTIVE | COMMUNITY
Start: 2023-04-18 | End: 1900-01-01

## 2023-12-01 ENCOUNTER — RX RENEWAL (OUTPATIENT)
Age: 73
End: 2023-12-01

## 2024-04-05 ENCOUNTER — RX RENEWAL (OUTPATIENT)
Age: 74
End: 2024-04-05

## 2024-04-05 RX ORDER — VALSARTAN 80 MG/1
80 TABLET, COATED ORAL
Qty: 30 | Refills: 3 | Status: ACTIVE | COMMUNITY
Start: 2023-04-18 | End: 1900-01-01

## 2024-07-08 ENCOUNTER — NON-APPOINTMENT (OUTPATIENT)
Age: 74
End: 2024-07-08

## 2024-07-08 ENCOUNTER — APPOINTMENT (OUTPATIENT)
Dept: CARDIOLOGY | Facility: CLINIC | Age: 74
End: 2024-07-08
Payer: MEDICARE

## 2024-07-08 VITALS — SYSTOLIC BLOOD PRESSURE: 156 MMHG | DIASTOLIC BLOOD PRESSURE: 96 MMHG

## 2024-07-08 VITALS
HEART RATE: 56 BPM | BODY MASS INDEX: 24.38 KG/M2 | OXYGEN SATURATION: 98 % | WEIGHT: 180 LBS | SYSTOLIC BLOOD PRESSURE: 160 MMHG | DIASTOLIC BLOOD PRESSURE: 80 MMHG | HEIGHT: 72 IN

## 2024-07-08 DIAGNOSIS — R00.2 PALPITATIONS: ICD-10-CM

## 2024-07-08 DIAGNOSIS — I35.1 NONRHEUMATIC AORTIC (VALVE) INSUFFICIENCY: ICD-10-CM

## 2024-07-08 DIAGNOSIS — G57.63 LESION OF PLANTAR NERVE, BILATERAL LOWER LIMBS: ICD-10-CM

## 2024-07-08 DIAGNOSIS — R73.09 OTHER ABNORMAL GLUCOSE: ICD-10-CM

## 2024-07-08 DIAGNOSIS — I10 ESSENTIAL (PRIMARY) HYPERTENSION: ICD-10-CM

## 2024-07-08 PROCEDURE — 99214 OFFICE O/P EST MOD 30 MIN: CPT

## 2024-07-08 PROCEDURE — 93000 ELECTROCARDIOGRAM COMPLETE: CPT

## 2024-07-08 RX ORDER — AMLODIPINE BESYLATE 5 MG/1
5 TABLET ORAL DAILY
Qty: 30 | Refills: 5 | Status: ACTIVE | COMMUNITY
Start: 2024-07-08 | End: 1900-01-01

## 2024-10-16 ENCOUNTER — NON-APPOINTMENT (OUTPATIENT)
Age: 74
End: 2024-10-16

## 2024-10-21 ENCOUNTER — APPOINTMENT (OUTPATIENT)
Dept: CARDIOLOGY | Facility: CLINIC | Age: 74
End: 2024-10-21
Payer: MEDICARE

## 2024-10-21 ENCOUNTER — NON-APPOINTMENT (OUTPATIENT)
Age: 74
End: 2024-10-21

## 2024-10-21 VITALS
OXYGEN SATURATION: 97 % | BODY MASS INDEX: 24.65 KG/M2 | WEIGHT: 182 LBS | SYSTOLIC BLOOD PRESSURE: 148 MMHG | DIASTOLIC BLOOD PRESSURE: 76 MMHG | HEIGHT: 72 IN | HEART RATE: 52 BPM

## 2024-10-21 DIAGNOSIS — I10 ESSENTIAL (PRIMARY) HYPERTENSION: ICD-10-CM

## 2024-10-21 DIAGNOSIS — I35.1 NONRHEUMATIC AORTIC (VALVE) INSUFFICIENCY: ICD-10-CM

## 2024-10-21 DIAGNOSIS — R73.09 OTHER ABNORMAL GLUCOSE: ICD-10-CM

## 2024-10-21 DIAGNOSIS — I77.810 THORACIC AORTIC ECTASIA: ICD-10-CM

## 2024-10-21 PROCEDURE — 93000 ELECTROCARDIOGRAM COMPLETE: CPT

## 2024-10-21 PROCEDURE — 99214 OFFICE O/P EST MOD 30 MIN: CPT

## 2024-10-21 PROCEDURE — 93306 TTE W/DOPPLER COMPLETE: CPT

## 2024-10-21 PROCEDURE — G2211 COMPLEX E/M VISIT ADD ON: CPT

## 2025-02-27 ENCOUNTER — NON-APPOINTMENT (OUTPATIENT)
Age: 75
End: 2025-02-27

## 2025-02-27 ENCOUNTER — APPOINTMENT (OUTPATIENT)
Dept: CARDIOLOGY | Facility: CLINIC | Age: 75
End: 2025-02-27
Payer: MEDICARE

## 2025-02-27 VITALS
SYSTOLIC BLOOD PRESSURE: 135 MMHG | DIASTOLIC BLOOD PRESSURE: 75 MMHG | BODY MASS INDEX: 24.38 KG/M2 | WEIGHT: 180 LBS | OXYGEN SATURATION: 97 % | RESPIRATION RATE: 16 BRPM | HEART RATE: 63 BPM | HEIGHT: 72 IN

## 2025-02-27 DIAGNOSIS — I35.1 NONRHEUMATIC AORTIC (VALVE) INSUFFICIENCY: ICD-10-CM

## 2025-02-27 DIAGNOSIS — I77.810 THORACIC AORTIC ECTASIA: ICD-10-CM

## 2025-02-27 DIAGNOSIS — I10 ESSENTIAL (PRIMARY) HYPERTENSION: ICD-10-CM

## 2025-02-27 PROCEDURE — 93000 ELECTROCARDIOGRAM COMPLETE: CPT

## 2025-02-27 PROCEDURE — 99214 OFFICE O/P EST MOD 30 MIN: CPT

## 2025-02-27 PROCEDURE — G2211 COMPLEX E/M VISIT ADD ON: CPT

## 2025-03-14 ENCOUNTER — RX RENEWAL (OUTPATIENT)
Age: 75
End: 2025-03-14

## 2025-08-16 ENCOUNTER — RX RENEWAL (OUTPATIENT)
Age: 75
End: 2025-08-16

## 2025-08-28 ENCOUNTER — APPOINTMENT (OUTPATIENT)
Dept: CARDIOLOGY | Facility: CLINIC | Age: 75
End: 2025-08-28

## 2025-08-28 ENCOUNTER — APPOINTMENT (OUTPATIENT)
Dept: CARDIOLOGY | Facility: CLINIC | Age: 75
End: 2025-08-28
Payer: MEDICARE

## 2025-08-28 VITALS
BODY MASS INDEX: 24.79 KG/M2 | SYSTOLIC BLOOD PRESSURE: 139 MMHG | OXYGEN SATURATION: 97 % | WEIGHT: 183 LBS | HEART RATE: 54 BPM | DIASTOLIC BLOOD PRESSURE: 73 MMHG | HEIGHT: 72 IN

## 2025-08-28 DIAGNOSIS — I35.1 NONRHEUMATIC AORTIC (VALVE) INSUFFICIENCY: ICD-10-CM

## 2025-08-28 DIAGNOSIS — R00.1 BRADYCARDIA, UNSPECIFIED: ICD-10-CM

## 2025-08-28 DIAGNOSIS — R73.09 OTHER ABNORMAL GLUCOSE: ICD-10-CM

## 2025-08-28 DIAGNOSIS — I10 ESSENTIAL (PRIMARY) HYPERTENSION: ICD-10-CM

## 2025-08-28 DIAGNOSIS — M10.471 OTHER SECONDARY GOUT, RIGHT ANKLE AND FOOT: ICD-10-CM

## 2025-08-28 DIAGNOSIS — I77.810 THORACIC AORTIC ECTASIA: ICD-10-CM

## 2025-08-28 DIAGNOSIS — R53.83 OTHER FATIGUE: ICD-10-CM

## 2025-08-28 PROCEDURE — G2211 COMPLEX E/M VISIT ADD ON: CPT

## 2025-08-28 PROCEDURE — 99214 OFFICE O/P EST MOD 30 MIN: CPT

## 2025-08-28 PROCEDURE — 93000 ELECTROCARDIOGRAM COMPLETE: CPT

## 2025-09-17 ENCOUNTER — APPOINTMENT (OUTPATIENT)
Dept: INTERNAL MEDICINE | Facility: CLINIC | Age: 75
End: 2025-09-17
Payer: MEDICARE

## 2025-09-17 VITALS
SYSTOLIC BLOOD PRESSURE: 138 MMHG | HEART RATE: 66 BPM | OXYGEN SATURATION: 97 % | RESPIRATION RATE: 16 BRPM | HEIGHT: 72 IN | WEIGHT: 181 LBS | DIASTOLIC BLOOD PRESSURE: 60 MMHG | BODY MASS INDEX: 24.52 KG/M2 | TEMPERATURE: 98.2 F

## 2025-09-17 DIAGNOSIS — R53.83 OTHER FATIGUE: ICD-10-CM

## 2025-09-17 DIAGNOSIS — G57.63 LESION OF PLANTAR NERVE, BILATERAL LOWER LIMBS: ICD-10-CM

## 2025-09-17 DIAGNOSIS — Z13.6 ENCOUNTER FOR SCREENING FOR CARDIOVASCULAR DISORDERS: ICD-10-CM

## 2025-09-17 DIAGNOSIS — R73.09 OTHER ABNORMAL GLUCOSE: ICD-10-CM

## 2025-09-17 DIAGNOSIS — I10 ESSENTIAL (PRIMARY) HYPERTENSION: ICD-10-CM

## 2025-09-17 DIAGNOSIS — Z00.00 ENCOUNTER FOR GENERAL ADULT MEDICAL EXAMINATION W/OUT ABNORMAL FINDINGS: ICD-10-CM

## 2025-09-17 DIAGNOSIS — K59.1 FUNCTIONAL DIARRHEA: ICD-10-CM

## 2025-09-17 DIAGNOSIS — L71.9 ROSACEA, UNSPECIFIED: ICD-10-CM

## 2025-09-17 PROCEDURE — G0439: CPT

## 2025-09-19 PROBLEM — L71.9 ROSACEA: Status: ACTIVE | Noted: 2025-09-19
